# Patient Record
Sex: FEMALE | Race: WHITE | Employment: FULL TIME | URBAN - METROPOLITAN AREA
[De-identification: names, ages, dates, MRNs, and addresses within clinical notes are randomized per-mention and may not be internally consistent; named-entity substitution may affect disease eponyms.]

---

## 2023-09-01 PROBLEM — R09.81 SINUS CONGESTION: Status: ACTIVE | Noted: 2023-09-01

## 2023-09-01 PROBLEM — E56.9 VITAMIN DEFICIENCY: Status: ACTIVE | Noted: 2023-09-01

## 2023-09-01 PROBLEM — M79.18 MYOFASCIAL MUSCLE PAIN: Status: ACTIVE | Noted: 2023-09-01

## 2023-09-01 PROBLEM — J06.9 VIRAL URI: Status: ACTIVE | Noted: 2023-09-01

## 2023-09-01 PROBLEM — N94.10 DYSPAREUNIA IN FEMALE: Status: ACTIVE | Noted: 2023-09-01

## 2023-09-01 PROBLEM — M62.89 PELVIC FLOOR DYSFUNCTION: Status: ACTIVE | Noted: 2023-09-01

## 2023-09-01 PROBLEM — S49.90XA SHOULDER INJURY: Status: ACTIVE | Noted: 2023-09-01

## 2023-09-01 PROBLEM — N94.10 PAINFUL COITUS, FEMALE: Status: ACTIVE | Noted: 2023-09-01

## 2023-09-01 PROBLEM — N92.0 MENORRHAGIA WITH REGULAR CYCLE: Status: ACTIVE | Noted: 2023-09-01

## 2023-09-01 PROBLEM — R10.84 ABDOMINAL PAIN, DIFFUSE: Status: ACTIVE | Noted: 2023-09-01

## 2023-09-01 PROBLEM — R79.89 LOW VITAMIN D LEVEL: Status: ACTIVE | Noted: 2023-09-01

## 2023-09-01 PROBLEM — R14.0 BLOATING: Status: ACTIVE | Noted: 2023-09-01

## 2023-09-01 PROBLEM — N94.6 DYSMENORRHEA: Status: ACTIVE | Noted: 2023-09-01

## 2023-09-01 PROBLEM — N92.6 ABNORMAL MENSES: Status: ACTIVE | Noted: 2023-09-01

## 2023-09-01 PROBLEM — F41.9 ANXIETY: Status: ACTIVE | Noted: 2023-09-01

## 2023-09-01 PROBLEM — K81.1 CHRONIC CHOLECYSTITIS: Status: ACTIVE | Noted: 2023-09-01

## 2023-09-01 PROBLEM — N97.9 FEMALE INFERTILITY: Status: ACTIVE | Noted: 2023-09-01

## 2023-09-01 PROBLEM — R94.8 ABNORMAL BILIARY HIDA SCAN: Status: ACTIVE | Noted: 2023-09-01

## 2023-09-01 RX ORDER — ALBUTEROL SULFATE 90 UG/1
2 AEROSOL, METERED RESPIRATORY (INHALATION) EVERY 4 HOURS PRN
COMMUNITY
End: 2024-03-27 | Stop reason: WASHOUT

## 2023-09-01 RX ORDER — ACETAMINOPHEN 325 MG/1
650 TABLET ORAL EVERY 6 HOURS
COMMUNITY
Start: 2019-06-06 | End: 2024-05-01 | Stop reason: ALTCHOICE

## 2023-09-01 RX ORDER — ERGOCALCIFEROL 1.25 MG/1
1 CAPSULE ORAL
COMMUNITY
Start: 2020-01-20 | End: 2024-03-27 | Stop reason: WASHOUT

## 2023-09-01 RX ORDER — PANTOPRAZOLE SODIUM 20 MG/1
TABLET, DELAYED RELEASE ORAL
COMMUNITY
Start: 2019-03-26 | End: 2024-02-01 | Stop reason: WASHOUT

## 2023-09-01 RX ORDER — ZOLMITRIPTAN 5 MG/1
5 TABLET, FILM COATED ORAL DAILY PRN
COMMUNITY
Start: 2018-02-07 | End: 2024-03-27 | Stop reason: WASHOUT

## 2023-09-01 RX ORDER — DOCUSATE SODIUM 100 MG/1
100 CAPSULE, LIQUID FILLED ORAL 2 TIMES DAILY
COMMUNITY
Start: 2019-06-06 | End: 2024-03-27 | Stop reason: WASHOUT

## 2023-09-01 RX ORDER — CHOLECALCIFEROL (VITAMIN D3) 25 MCG
1 TABLET ORAL DAILY
COMMUNITY

## 2023-09-01 RX ORDER — ASCORBIC ACID 250 MG
TABLET ORAL
COMMUNITY

## 2023-09-01 RX ORDER — PANTOPRAZOLE SODIUM 40 MG/1
1 TABLET, DELAYED RELEASE ORAL DAILY
COMMUNITY
End: 2024-02-01

## 2023-09-01 RX ORDER — SULFAMETHOXAZOLE AND TRIMETHOPRIM 800; 160 MG/1; MG/1
1 TABLET ORAL
COMMUNITY
Start: 2021-11-09 | End: 2024-03-27 | Stop reason: WASHOUT

## 2023-09-01 RX ORDER — PREDNISONE 50 MG/1
50 TABLET ORAL DAILY
COMMUNITY
End: 2024-03-27 | Stop reason: WASHOUT

## 2023-09-01 RX ORDER — IBUPROFEN 600 MG/1
600 TABLET ORAL EVERY 6 HOURS
COMMUNITY
Start: 2019-06-06 | End: 2024-05-01 | Stop reason: WASHOUT

## 2023-10-01 DIAGNOSIS — N94.10 PAINFUL COITUS, FEMALE: Primary | ICD-10-CM

## 2023-10-02 ENCOUNTER — TREATMENT (OUTPATIENT)
Dept: PHYSICAL THERAPY | Facility: CLINIC | Age: 43
End: 2023-10-02
Payer: COMMERCIAL

## 2023-10-02 DIAGNOSIS — N94.10 PAINFUL COITUS, FEMALE: ICD-10-CM

## 2023-10-02 DIAGNOSIS — M62.89 PELVIC FLOOR DYSFUNCTION: Primary | ICD-10-CM

## 2023-10-02 PROCEDURE — 97530 THERAPEUTIC ACTIVITIES: CPT | Mod: GP

## 2023-10-02 PROCEDURE — 97110 THERAPEUTIC EXERCISES: CPT | Mod: GP

## 2023-10-02 PROCEDURE — 97140 MANUAL THERAPY 1/> REGIONS: CPT | Mod: GP

## 2023-10-02 ASSESSMENT — PAIN SCALES - GENERAL: PAINLEVEL_OUTOF10: 0 - NO PAIN

## 2023-10-02 NOTE — PROGRESS NOTES
Physical Therapy    Physical Therapy Treatment    Patient Name: Mary Ann Haro  MRN: [id number]  Today's Date: 10/2/2023  Time Calculation  Start Time: 0800  Stop Time: 0840  Time Calculation (min): 40 min      Assessment:  PT Assessment  PT Assessment Results:  (Decreased pain with internal examination/treatment, decreased leakage, improved spread of voids)  Rehab Prognosis: Excellent Decreased pain with internal examination/treatment, decreased leakage, improved spread of voids     Plan:  OP PT Plan  PT Plan:  (Continue per POC)Continue treatment per POC    Current Problem  1. Pelvic floor dysfunction        2. Painful coitus, female  Follow Up In Physical Therapy          Subjective   General  Pt states she tried a tampon, was fabian to have it in for 5-6 hours. Began to notice at the end it was time to take it out. Clamshells are still challenging.               Pain  Pain Score: 0 - No pain      Treatments:  Therapeutic Activity  Therapeutic Activity Performed:  (Educated pt on use of dilators. Went over types of dilators and handout given on use of dilators independently at home.)  Therapeutic Exercise:    -Recumbent bike 8mins    Therapeutic Activity:   Educated pt on use of dilators. Went over types of dilators and handout given on use of dilators independently at home.     Manual Therapy:   DTM/stretching of pelvic floor musculature    OP EDUCATION:  Education  Individual(s) Educated: Patient  Education Provided: Home Exercise Program, Other Education provided to pt on dilator use, POC, HEP.    Goals:  Encounter Problems       Encounter Problems (Active)       Pelvic Floor       Pt will reduce pelvic pain/discomfort to at most 3/10 with internal examination       Start:  10/02/23    Expected End:  11/27/23            Pt will be able to tolerate sexual intercourse with minimal limitations to improve sex life, per pt's goal       Start:  10/02/23    Expected End:  11/27/23            Pt will reduce urinary  "leakage by at least 50% to improve pelvic floor coordination        Start:  10/02/23    Expected End:  11/27/23            Pt will be able to spread voids 2-4 hours apart to improve bladder capacity       Start:  10/02/23    Expected End:  11/27/23            Pt will improve \"PFIQ-7\" score to 70 for increased independence and ease with ADL/IADL's, skills, and work/leisure activities.        Start:  10/02/23    Expected End:  11/27/23                    "

## 2023-10-09 ENCOUNTER — TREATMENT (OUTPATIENT)
Dept: PHYSICAL THERAPY | Facility: CLINIC | Age: 43
End: 2023-10-09
Payer: COMMERCIAL

## 2023-10-09 DIAGNOSIS — N94.10 PAINFUL COITUS, FEMALE: ICD-10-CM

## 2023-10-09 DIAGNOSIS — M62.89 PELVIC FLOOR DYSFUNCTION: Primary | ICD-10-CM

## 2023-10-09 PROCEDURE — 97530 THERAPEUTIC ACTIVITIES: CPT | Mod: GP

## 2023-10-09 ASSESSMENT — PAIN SCALES - GENERAL: PAINLEVEL_OUTOF10: 1

## 2023-10-09 ASSESSMENT — PAIN - FUNCTIONAL ASSESSMENT: PAIN_FUNCTIONAL_ASSESSMENT: 0-10

## 2023-10-09 NOTE — PROGRESS NOTES
"Physical Therapy    Physical Therapy Treatment / Re-evaluation    Patient Name: Mary Ann Haro  MRN: [id number]  Today's Date: 10/9/2023  Time Calculation  Start Time: 0800  Stop Time: 0824  Time Calculation (min): 24 min      Assessment: Pt reports significant improvement with leakage, bladder capacity, and pelvic pain with examination/intercourse.        Plan: Pt to complete independent use of dilator at home, If she has questions/concerns will return in 2 weeks for follow-up. If patient has no concerns, she will be discharged from PT services.       Current Problem  1. Pelvic floor dysfunction        2. Painful coitus, female  Follow Up In Physical Therapy          Subjective   General Pt states she is doing well, intimate twice. One was successful and one was not as successful, wasn't as consistent with exercises due to trip, did a lot of hiking. 6th visit. Pt has not had any urinary leakage in 1 week. Able to spread voids 2-4 hours apart.          Pain  Pain Assessment: 0-10  Pain Score: 1  Pain Type:  (Sore)  Pain Location:  (Low back)    Objective   Cognition     Posture   Posture: increased thoracic kyphosis, increased lumbar lordosis  Extremity/Trunk Assessment      Lumbar ROM:  -Extension: min limitation, \"very tight\"  -SB: min limitation Bilaterally  -Rotation: min limitation Bilaterally    Flexibility:  -Adductors: fair Bilaterally  -Glut max: fair Bilaterally    Strength:  -Hip abductors: 3+/5 Bilaterally  -Hip adductors: 3+/5 Bilaterally  -Hip extension: 4/5 Bilaterally          Internal Examination: Vaginal Strength: 3/5   Internal Palpation:     Pt denies pain with internal examinations, she states there can be slight discomfort 7 and 5 o'clock    Outcome Measures:  PFIQ-7: 29    Treatments:    Therapeutic Activity:              Pt brought in purchased dilator set, discussed cleaning and use with dilator. Pt to complete at home independently.          OP EDUCATION:   POC, dilator use    Goals:   " "  Encounter Problems         Encounter Problems (Active)         Pelvic Floor         Pt will reduce pelvic pain/discomfort to at most 3/10 with internal examination - GOAL MET 10/9/23        Start:  10/02/23    Expected End:  11/27/23              Pt will be able to tolerate sexual intercourse with minimal limitations to improve sex life, per pt's goal -PROGRESSING         Start:  10/02/23    Expected End:  11/27/23              Pt will reduce urinary leakage by at least 50% to improve pelvic floor coordination  - GOAL MET 10/9/23        Start:  10/02/23    Expected End:  11/27/23              Pt will be able to spread voids 2-4 hours apart to improve bladder capacity - GOAL MET 10/9/23        Start:  10/02/23    Expected End:  11/27/23              Pt will improve \"PFIQ-7\" score to 70 for increased independence and ease with ADL/IADL's, skills, and work/leisure activities.  -GOAL MET 10/9/23        Start:  10/02/23    Expected End:  11/27/23                  "

## 2023-10-17 ENCOUNTER — OFFICE VISIT (OUTPATIENT)
Dept: OBSTETRICS AND GYNECOLOGY | Facility: CLINIC | Age: 43
End: 2023-10-17
Payer: COMMERCIAL

## 2023-10-17 ENCOUNTER — APPOINTMENT (OUTPATIENT)
Dept: PHYSICAL THERAPY | Facility: CLINIC | Age: 43
End: 2023-10-17
Payer: COMMERCIAL

## 2023-10-17 VITALS
DIASTOLIC BLOOD PRESSURE: 81 MMHG | BODY MASS INDEX: 37.69 KG/M2 | HEART RATE: 65 BPM | SYSTOLIC BLOOD PRESSURE: 130 MMHG | HEIGHT: 62 IN | WEIGHT: 204.8 LBS

## 2023-10-17 DIAGNOSIS — R10.2 PELVIC PAIN: ICD-10-CM

## 2023-10-17 LAB
POC APPEARANCE, URINE: CLEAR
POC BILIRUBIN, URINE: NEGATIVE
POC BLOOD, URINE: NEGATIVE
POC COLOR, URINE: YELLOW
POC GLUCOSE, URINE: NEGATIVE MG/DL
POC KETONES, URINE: NEGATIVE MG/DL
POC LEUKOCYTES, URINE: NEGATIVE
POC NITRITE,URINE: NEGATIVE
POC PH, URINE: 6 PH
POC PROTEIN, URINE: NEGATIVE MG/DL
POC SPECIFIC GRAVITY, URINE: 1.02
POC UROBILINOGEN, URINE: 0.2 EU/DL

## 2023-10-17 PROCEDURE — 99213 OFFICE O/P EST LOW 20 MIN: CPT | Performed by: NURSE PRACTITIONER

## 2023-10-17 PROCEDURE — 81003 URINALYSIS AUTO W/O SCOPE: CPT | Mod: QW | Performed by: NURSE PRACTITIONER

## 2023-10-17 ASSESSMENT — PATIENT HEALTH QUESTIONNAIRE - PHQ9
SUM OF ALL RESPONSES TO PHQ9 QUESTIONS 1 AND 2: 1
2. FEELING DOWN, DEPRESSED OR HOPELESS: NOT AT ALL
1. LITTLE INTEREST OR PLEASURE IN DOING THINGS: SEVERAL DAYS

## 2023-10-17 ASSESSMENT — PAIN SCALES - GENERAL: PAINLEVEL: 0-NO PAIN

## 2023-10-17 NOTE — PROGRESS NOTES
History of Present illness    Pelvic Pain  The patient's primary symptoms include pelvic pain.   Pelvic pain  PFPT  Home release and exercise    Record Review:  Hx of pelvic pain.  Currently in pelvic floor physical therapy with Mary Carrillo.    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  head and face: head and face, unremarkable.   Pulmnary: no respiratory distress, unremarkable respiratory effort.   Psychiatric: alert and oriented x 3, affect normal to patient baseline, mood: appropriate judgement and insight: intact.     Assessment/Plan  43 y.o. assessed in clinic today for Pelvic pain    Pelvic pain    Plan  Pelvic pain  - Advised to keep usnig her exercises for future purposes   - Continue PFPT with Mary CALL in 6 months with HUMA Denney for pelvic pain fuv    I, Susana Pineda, am scribing for virtually, and in the presence of HUMA Denney on 10/17/2023 at 5:54 PM

## 2023-10-23 ENCOUNTER — APPOINTMENT (OUTPATIENT)
Dept: PHYSICAL THERAPY | Facility: CLINIC | Age: 43
End: 2023-10-23
Payer: COMMERCIAL

## 2023-10-31 ENCOUNTER — APPOINTMENT (OUTPATIENT)
Dept: PHYSICAL THERAPY | Facility: CLINIC | Age: 43
End: 2023-10-31
Payer: COMMERCIAL

## 2023-12-04 ENCOUNTER — DOCUMENTATION (OUTPATIENT)
Dept: PHYSICAL THERAPY | Facility: CLINIC | Age: 43
End: 2023-12-04
Payer: COMMERCIAL

## 2024-02-01 DIAGNOSIS — K21.9 CHRONIC GERD: Primary | ICD-10-CM

## 2024-02-01 RX ORDER — PANTOPRAZOLE SODIUM 40 MG/1
40 TABLET, DELAYED RELEASE ORAL DAILY
Qty: 30 TABLET | Refills: 0 | Status: SHIPPED | OUTPATIENT
Start: 2024-02-01 | End: 2024-02-26

## 2024-02-25 DIAGNOSIS — K21.9 CHRONIC GERD: ICD-10-CM

## 2024-02-26 RX ORDER — PANTOPRAZOLE SODIUM 40 MG/1
40 TABLET, DELAYED RELEASE ORAL DAILY
Qty: 90 TABLET | Refills: 1 | Status: SHIPPED | OUTPATIENT
Start: 2024-02-26 | End: 2024-03-27 | Stop reason: SDUPTHER

## 2024-03-27 ENCOUNTER — OFFICE VISIT (OUTPATIENT)
Dept: PRIMARY CARE | Facility: CLINIC | Age: 44
End: 2024-03-27
Payer: COMMERCIAL

## 2024-03-27 VITALS
OXYGEN SATURATION: 99 % | TEMPERATURE: 97.3 F | HEART RATE: 67 BPM | WEIGHT: 202.6 LBS | SYSTOLIC BLOOD PRESSURE: 128 MMHG | DIASTOLIC BLOOD PRESSURE: 79 MMHG | HEIGHT: 62 IN | BODY MASS INDEX: 37.28 KG/M2

## 2024-03-27 DIAGNOSIS — R09.81 SINUS CONGESTION: Primary | ICD-10-CM

## 2024-03-27 DIAGNOSIS — K21.9 CHRONIC GERD: ICD-10-CM

## 2024-03-27 PROCEDURE — 1036F TOBACCO NON-USER: CPT | Performed by: FAMILY MEDICINE

## 2024-03-27 PROCEDURE — 99214 OFFICE O/P EST MOD 30 MIN: CPT | Performed by: FAMILY MEDICINE

## 2024-03-27 RX ORDER — PANTOPRAZOLE SODIUM 40 MG/1
40 TABLET, DELAYED RELEASE ORAL DAILY
Qty: 90 TABLET | Refills: 1 | Status: SHIPPED | OUTPATIENT
Start: 2024-03-27

## 2024-03-27 RX ORDER — METHYLPREDNISOLONE 4 MG/1
TABLET ORAL
Qty: 21 TABLET | Refills: 0 | Status: SHIPPED | OUTPATIENT
Start: 2024-03-27 | End: 2024-05-01 | Stop reason: ALTCHOICE

## 2024-03-27 RX ORDER — AZELASTINE 1 MG/ML
1 SPRAY, METERED NASAL 2 TIMES DAILY
Qty: 30 ML | Refills: 12 | Status: SHIPPED | OUTPATIENT
Start: 2024-03-27 | End: 2025-03-27

## 2024-03-27 RX ORDER — FLUTICASONE PROPIONATE 50 MCG
SPRAY, SUSPENSION (ML) NASAL
COMMUNITY
Start: 2024-02-17 | End: 2024-05-01 | Stop reason: WASHOUT

## 2024-03-27 ASSESSMENT — ENCOUNTER SYMPTOMS
SINUS PRESSURE: 1
SINUS COMPLAINT: 1
SINUS PAIN: 1

## 2024-03-27 ASSESSMENT — PATIENT HEALTH QUESTIONNAIRE - PHQ9
1. LITTLE INTEREST OR PLEASURE IN DOING THINGS: NOT AT ALL
SUM OF ALL RESPONSES TO PHQ9 QUESTIONS 1 AND 2: 0
2. FEELING DOWN, DEPRESSED OR HOPELESS: NOT AT ALL

## 2024-03-27 ASSESSMENT — PAIN SCALES - GENERAL: PAINLEVEL: 1

## 2024-03-27 NOTE — PROGRESS NOTES
"Subjective   Patient ID: Mary Ann Haro is a 43 y.o. female who presents for Sinus Problem (Pt had sinus infection 1 month ago did telemed appt still having drainage and pressure under eyes and coughs when it drains  drainage is yellow or green).    Sinus Problem  Associated symptoms include sinus pressure.     Mary Ann is presenting for concerns of ongoing sinus pain and pressure, previously completed an antibiotic course without relief, has tried zyrtec, netipots, flonase vicks.   Continued to feel pressure particularly behind the eyes and below the eyes.  Nettipots aren't passing through   Currently taking claritin and flonase      Review of Systems   HENT:  Positive for sinus pressure and sinus pain.        Objective   /79   Pulse 67   Temp 36.3 °C (97.3 °F)   Ht 1.575 m (5' 2\")   Wt 91.9 kg (202 lb 9.6 oz)   SpO2 99%   BMI 37.06 kg/m²     Physical Exam  Constitutional:       Appearance: Normal appearance.      Comments: Frontal and maxillary tenderness to palpation   HENT:      Head: Normocephalic and atraumatic.      Right Ear: External ear normal.      Left Ear: External ear normal.      Nose: Nose normal. No congestion or rhinorrhea.   Eyes:      General: No scleral icterus.     Extraocular Movements: Extraocular movements intact.      Conjunctiva/sclera: Conjunctivae normal.   Cardiovascular:      Rate and Rhythm: Normal rate and regular rhythm.      Pulses: Normal pulses.      Heart sounds: Normal heart sounds.   Pulmonary:      Effort: Pulmonary effort is normal.      Breath sounds: Normal breath sounds.   Musculoskeletal:      Cervical back: Normal range of motion and neck supple. Tenderness present.   Skin:     General: Skin is warm and dry.      Capillary Refill: Capillary refill takes less than 2 seconds.   Neurological:      General: No focal deficit present.      Mental Status: She is alert and oriented to person, place, and time. Mental status is at baseline.   Psychiatric:        "  Mood and Affect: Mood normal.         Behavior: Behavior normal.         Thought Content: Thought content normal.         Judgment: Judgment normal.         Assessment/Plan   Diagnoses and all orders for this visit:  Sinus congestion  -     Referral to ENT; Future  -     azelastine (Astelin) 137 mcg (0.1 %) nasal spray; Administer 1 spray into each nostril 2 times a day. Use in each nostril as directed  -     methylPREDNISolone (Medrol, Luc,) 4 mg tablets; Follow schedule on package instructions  Chronic GERD  -     pantoprazole (ProtoNix) 40 mg EC tablet; Take 1 tablet (40 mg) by mouth once daily.    Mary Ann is a 43-year-old female presenting for ongoing pain and pressure behind the eyes.  Previously had a sinus infection completed course of antibiotics without much relief.  She particular reports pressure behind her eyes and below her eyes on physical exam she had pain to frontal sinuses and maxillary sinuses.  As patient reports [place] [facility] are not passing through concerns for inflammation or obstruction and she would benefit from a scope.  Patient's  will be presenting to Dr. Overton therefore referral placed today for the same physician.  Prescribed azelastine today in place of Flonase and refill on her Protonix. Medrol Dosepak given for concerns of inflammation.  Patient does not appear to be ill no fevers vitals remained stable no concerns for recurrence of infection.      I have personally reviewed all available pertinent labs, imaging, and consult notes with the patient.   All questions and concerns were addressed. Patient verbalizes understanding instructions and agrees with established plan of care.   Patient seen and discussed with Dr. Kaushik Aguayo MD

## 2024-04-23 ENCOUNTER — APPOINTMENT (OUTPATIENT)
Dept: OBSTETRICS AND GYNECOLOGY | Facility: CLINIC | Age: 44
End: 2024-04-23
Payer: COMMERCIAL

## 2024-04-24 ENCOUNTER — LAB (OUTPATIENT)
Dept: LAB | Facility: LAB | Age: 44
End: 2024-04-24
Payer: COMMERCIAL

## 2024-04-24 ENCOUNTER — OFFICE VISIT (OUTPATIENT)
Dept: OTOLARYNGOLOGY | Facility: CLINIC | Age: 44
End: 2024-04-24
Payer: COMMERCIAL

## 2024-04-24 VITALS — WEIGHT: 203 LBS | HEIGHT: 62 IN | BODY MASS INDEX: 37.36 KG/M2

## 2024-04-24 DIAGNOSIS — J31.0 CHRONIC RHINITIS: Primary | ICD-10-CM

## 2024-04-24 DIAGNOSIS — J31.0 CHRONIC RHINITIS: ICD-10-CM

## 2024-04-24 DIAGNOSIS — J34.2 NASAL SEPTAL DEVIATION: ICD-10-CM

## 2024-04-24 DIAGNOSIS — J34.3 HYPERTROPHY OF INFERIOR NASAL TURBINATE: ICD-10-CM

## 2024-04-24 DIAGNOSIS — J34.89 NASAL OBSTRUCTION: ICD-10-CM

## 2024-04-24 DIAGNOSIS — R09.81 SINUS CONGESTION: ICD-10-CM

## 2024-04-24 PROCEDURE — 1036F TOBACCO NON-USER: CPT | Performed by: NURSE PRACTITIONER

## 2024-04-24 PROCEDURE — 99204 OFFICE O/P NEW MOD 45 MIN: CPT | Performed by: NURSE PRACTITIONER

## 2024-04-24 PROCEDURE — 36415 COLL VENOUS BLD VENIPUNCTURE: CPT

## 2024-04-24 PROCEDURE — 31231 NASAL ENDOSCOPY DX: CPT | Performed by: NURSE PRACTITIONER

## 2024-04-24 PROCEDURE — 82785 ASSAY OF IGE: CPT

## 2024-04-24 PROCEDURE — 86003 ALLG SPEC IGE CRUDE XTRC EA: CPT

## 2024-04-24 RX ORDER — TRIAMCINOLONE ACETONIDE 55 UG/1
1 SPRAY, METERED NASAL 2 TIMES DAILY
Qty: 16.5 G | Refills: 11 | Status: SHIPPED | OUTPATIENT
Start: 2024-04-24 | End: 2025-04-24

## 2024-04-24 ASSESSMENT — PATIENT HEALTH QUESTIONNAIRE - PHQ9
1. LITTLE INTEREST OR PLEASURE IN DOING THINGS: SEVERAL DAYS
10. IF YOU CHECKED OFF ANY PROBLEMS, HOW DIFFICULT HAVE THESE PROBLEMS MADE IT FOR YOU TO DO YOUR WORK, TAKE CARE OF THINGS AT HOME, OR GET ALONG WITH OTHER PEOPLE: NOT DIFFICULT AT ALL
SUM OF ALL RESPONSES TO PHQ9 QUESTIONS 1 AND 2: 2
2. FEELING DOWN, DEPRESSED OR HOPELESS: SEVERAL DAYS

## 2024-04-24 NOTE — PROGRESS NOTES
Subjective   Patient ID: Mary Ann Haro is a 43 y.o. female who presents for Sinus Problem.    HPI  Mary Ann Haro is a 43 y.o. old female, referred by Dr. Brent Faulkner presenting with complaints of sinus and nose problems that began in December 2023 however, she reports having long standing chronic sinus issues her whole life. States she has had previous CT scans for this many years ago by PCP. The patient describes the sinus/nose problems as gradual and persistent sinusitis. Patient has facial pressure over her cheeks, PND, and constant throat clearing (milky / green / yellow ) that have persisted after a sinus infection she was treated for in February 2024. Denies rhinorrhea, facial pain, periorbital edema, nasal obstruction, hoarseness, loss of taste, and loss of smell. The patient denies epistaxis. The patient has taken Flonase nasal spray for three weeks, she did not notice benefit with this. She was told by her PCP to stop Flonase and start Azelastine. She has been on this for about one month and has noticed some benefit. Also takes Claritin daily. The patient has tried nasal saline irrigations with neti pot however she has difficultly with this passing through her nasal passages currently. She has also tried Vicks soothers, and steam showers which give some relief. Does have a history of allergic rhinitis or allergies. Denies previous allergy testing. Works as [employer]. They deny a history of aspirin sensitivity. They report 1-2 sinus infections per year requiring antibiotic treatment. Most recent antibiotic usage includes: February 2024 through [facility] for sinus infection, Doxycycline BID X 10 days.    History of prior nasal/sinus surgery or procedure: Denies.   Reports previous CT scan >10 years ago that did show chronic sinus disease.     I personally reviewed the patients CT head scan images from 12/10/2010.     I have also reviewed prior note from Dr. Brent Faulkner dated 3/27/24 and this is  contributing to my history and assessment.     Review of Systems  Review of systems is negative for constitutional, ophthalmological, cardiac, pulmonary, renal, gastrointestinal, musculoskeletal, mental health, endocrine, or neurologic disorders (except as listed in the HPI, PMH, and Problem List).     Objective   Physical Exam  CONSTITUTIONAL: Vital signs reviewed. Patient appears well developed and well nourished.   GENERAL: this is a healthy appearing female who appears stated age. The patient is alert and appropriately verbally conversant without hoarseness. This patient is in no apparent distress.   FACE: The face was inspected and no cutaneous masses or lesions were visualized. There was no erythema or edema noted. Facial movement was symmetric. No skin lesions were detected. There was no sinus tenderness elicited. TMJ crepitus absent.   EYES: Extra-ocular muscle function was intact. No nystagmus was observed. Pupils were equal.   CRANIAL NERVES: Cranial nerves II, III, IV, and VI were noted to be intact via extra-ocular muscle movement testing. Cranial nerve VII noted to be intact and symmetric by facial movement. Cranial nerves IX and X noted to be intact by gag reflex and palatal movement. Cranial nerve XII noted to be intact by active and symmetric tongue movement.   NOSE: Examination of the nose revealed the nasal dorsum to be midline. Intranasal exam reveals the septum is deviated left. The inferior turbinates were hypertrophic. No masses, polyps, mucopus, or other lesion on anterior rhinoscopy. See below procedure note as applicable for further exam.  ORAL CAVITY: Examination of the oral cavity revealed no mass lesions nor infection. The palate was noted to be intact. The tongue exhibited normal mobility. Mucosa was moist without lesion. The lips were free of lesion. Gums were free of inflammation. Dentition: normal without obvious infection or inflammation  OROPHARYNX: The oral pharynx was free of mass  lesion or mucosal abnormality. The palate was noted to be without lesion. The uvula was normal appearing. The tonsils were Normal.  EARS: Examination of the ears revealed that the auricles were normally formed with no lesions. The external auditory canals were normal. The tympanic membranes were intact.  There is no inflammation visualized.   NECK: Visualization and palpation of the neck revealed no mass lesions. No skin lesions or inflammatory processes were detected. The cervical musculature was normal to palpation.   CERVICAL LYMPHATICS: There were no palpable lymph nodes in the posterior triangle, submandibular triangle, jugulodigastric region, or central neck.  RESPIRATORY: Normal inspiration and expiration and chest wall expansion, no use of accessory muscles to breathe, no stridor.  NEUROLOGICAL: Patient is ambulatory without assist. Mentation is clear. Answering questions appropriately.     Nasal / sinus endoscopy    Date/Time: 4/24/2024 9:58 AM    Performed by: HUMA Ryan  Authorized by: HUMA Ryan    Consent:     Consent obtained:  Verbal    Consent given by:  Patient    Risks discussed:  Bleeding, infection and pain    Alternatives discussed:  No treatment, observation and delayed treatment  Procedure details:     Indications: assessment of airway and sino-nasal symptoms      Medication:  Afrin and Sotero-Synephrine 2%    Instrument: flexible fiberoptic nasal endoscope      Scope location: bilateral nare    Post-procedure details:     Patient tolerance of procedure:  Tolerated well, no immediate complications  Comments:      Findings: After topical decongestion with decongestant and anesthetic spray, nasal endoscopy was performed using an endoscope. The septum was deviated left. The inferior turbinates were hypertrophic. The middle turbinates appeared edematous on the left, the middle meatus is free of purulence, masses, lesions or polyps. The superior meatus and  sphenoethmoid recess are clear bilaterally. The nasal passageway is patent. The nasopharynx was clear. There were no complications and the patient tolerated the procedure well.        Assessment/Plan     Mary Ann Haro is a 43 y.o. year old female with symptoms and clinical findings consistent with chronic rhinitis, inferior turbinate hypertrophy and a left nasal septal deviation.      Plan:  Nasal endoscopy: Findings: left dev, ITH, polypoid changes to the middle turb  I discussed the findings the patient and offered reassurance and counseling.  We agreed to proceed with therapeutic measures to address the issues noted above.   1. We will have the patient start a steroid nasal spray to help improve nasal symptoms. Prescription for Nasacort  was given. I recommended the patient trial this for at least 1 month. They were instructed to use the spray 1 puff in each nostril twice daily. She was also instructed on the appropriate use of the medication, by point it towards the lateral wall of the nose/corner of the eye on the same side.    2. Patient was instructed to continue azelastine nasal spray. She was advised to increase this to 2 sprays twice daily.  3. I also recommended an allergy panel to assess for allergies that are potentially triggering the rhinitis.  4. Patient will follow-up in 6-8 weeks to assess for benefit of therapies and for further management.  All questions were answered and patient agrees with established plan of care.

## 2024-04-24 NOTE — PATIENT INSTRUCTIONS
Today you were evaluated by Hilda Vela CNP.    Please follow-up in 6-8 weeks or sooner if needed. If you have any questions or concerns, please contact my office at [phone].     Begin Nasacort with the azelastine. You can increase azelastine to 2 sprays twice daily.    MEDICATED NASAL SPRAY INSTRUCTIONS  Please take the prescribed nasal spray as directed. BE SURE TO POINT THE SPRAY TOWARDS THE CORNER OF THE EYE ON THE SAME SIDE NOSTRIL. This will ensure you are treating the appropriate parts of your nose that are swollen or inflamed.  This medication will take upwards of one month before you notice full benefit. You MUST use it every day for it to be effective.     Please obtain blood work. I will call you with results.

## 2024-04-24 NOTE — LETTER
April 24, 2024     Brent Faulkner DO  [address] [place] [id number]    Patient: Mary Ann Haro   YOB: 1980   Date of Visit: 4/24/2024       Dear Dr. Brent Faulkner DO:    Thank you for referring Mary Ann Haro to me for evaluation. Below are my notes for this consultation.  If you have questions, please do not hesitate to call me. I look forward to following your patient along with you.       Sincerely,     Hilda Vela, APRN-CNP      CC: No Recipients  ______________________________________________________________________________________    Subjective   Patient ID: Mary Ann Haro is a 43 y.o. female who presents for Sinus Problem.    HPI  Mary Ann Haro is a 43 y.o. old female, referred by Dr. Brent Faulkner presenting with complaints of sinus and nose problems that began in December 2023 however, she reports having long standing chronic sinus issues her whole life. States she has had previous CT scans for this many years ago by PCP. The patient describes the sinus/nose problems as gradual and persistent sinusitis. Patient has facial pressure over her cheeks, PND, and constant throat clearing (milky / green / yellow ) that have persisted after a sinus infection she was treated for in February 2024. Denies rhinorrhea, facial pain, periorbital edema, nasal obstruction, hoarseness, loss of taste, and loss of smell. The patient denies epistaxis. The patient has taken Flonase nasal spray for three weeks, she did not notice benefit with this. She was told by her PCP to stop Flonase and start Azelastine. She has been on this for about one month and has noticed some benefit. Also takes Claritin daily. The patient has tried nasal saline irrigations with neti pot however she has difficultly with this passing through her nasal passages currently. She has also tried Vicks soothers, and steam showers which give some relief. Does have a history of allergic rhinitis or allergies.  Denies previous allergy testing. Works as [employer]. They deny a history of aspirin sensitivity. They report 1-2 sinus infections per year requiring antibiotic treatment. Most recent antibiotic usage includes: February 2024 through [facility] for sinus infection, Doxycycline BID X 10 days.    History of prior nasal/sinus surgery or procedure: Denies.   Reports previous CT scan >10 years ago that did show chronic sinus disease.     I personally reviewed the patients CT head scan images from 12/10/2010.     I have also reviewed prior note from Dr. Brent Faulkner dated 3/27/24 and this is contributing to my history and assessment.     Review of Systems  Review of systems is negative for constitutional, ophthalmological, cardiac, pulmonary, renal, gastrointestinal, musculoskeletal, mental health, endocrine, or neurologic disorders (except as listed in the HPI, PMH, and Problem List).     Objective   Physical Exam  CONSTITUTIONAL: Vital signs reviewed. Patient appears well developed and well nourished.   GENERAL: this is a healthy appearing female who appears stated age. The patient is alert and appropriately verbally conversant without hoarseness. This patient is in no apparent distress.   FACE: The face was inspected and no cutaneous masses or lesions were visualized. There was no erythema or edema noted. Facial movement was symmetric. No skin lesions were detected. There was no sinus tenderness elicited. TMJ crepitus absent.   EYES: Extra-ocular muscle function was intact. No nystagmus was observed. Pupils were equal.   CRANIAL NERVES: Cranial nerves II, III, IV, and VI were noted to be intact via extra-ocular muscle movement testing. Cranial nerve VII noted to be intact and symmetric by facial movement. Cranial nerves IX and X noted to be intact by gag reflex and palatal movement. Cranial nerve XII noted to be intact by active and symmetric tongue movement.   NOSE: Examination of the nose revealed the nasal dorsum to be  midline. Intranasal exam reveals the septum is deviated left. The inferior turbinates were hypertrophic. No masses, polyps, mucopus, or other lesion on anterior rhinoscopy. See below procedure note as applicable for further exam.  ORAL CAVITY: Examination of the oral cavity revealed no mass lesions nor infection. The palate was noted to be intact. The tongue exhibited normal mobility. Mucosa was moist without lesion. The lips were free of lesion. Gums were free of inflammation. Dentition: normal without obvious infection or inflammation  OROPHARYNX: The oral pharynx was free of mass lesion or mucosal abnormality. The palate was noted to be without lesion. The uvula was normal appearing. The tonsils were Normal.  EARS: Examination of the ears revealed that the auricles were normally formed with no lesions. The external auditory canals were normal. The tympanic membranes were intact.  There is no inflammation visualized.   NECK: Visualization and palpation of the neck revealed no mass lesions. No skin lesions or inflammatory processes were detected. The cervical musculature was normal to palpation.   CERVICAL LYMPHATICS: There were no palpable lymph nodes in the posterior triangle, submandibular triangle, jugulodigastric region, or central neck.  RESPIRATORY: Normal inspiration and expiration and chest wall expansion, no use of accessory muscles to breathe, no stridor.  NEUROLOGICAL: Patient is ambulatory without assist. Mentation is clear. Answering questions appropriately.     Nasal / sinus endoscopy    Date/Time: 4/24/2024 9:58 AM    Performed by: HUMA Ryan  Authorized by: HUMA Ryan    Consent:     Consent obtained:  Verbal    Consent given by:  Patient    Risks discussed:  Bleeding, infection and pain    Alternatives discussed:  No treatment, observation and delayed treatment  Procedure details:     Indications: assessment of airway and sino-nasal symptoms      Medication:   Afrin and Sotero-Synephrine 2%    Instrument: flexible fiberoptic nasal endoscope      Scope location: bilateral nare    Post-procedure details:     Patient tolerance of procedure:  Tolerated well, no immediate complications  Comments:      Findings: After topical decongestion with decongestant and anesthetic spray, nasal endoscopy was performed using an endoscope. The septum was deviated left. The inferior turbinates were hypertrophic. The middle turbinates appeared edematous on the left, the middle meatus is free of purulence, masses, lesions or polyps. The superior meatus and sphenoethmoid recess are clear bilaterally. The nasal passageway is patent. The nasopharynx was clear. There were no complications and the patient tolerated the procedure well.        Assessment/Plan     Mary Ann Haro is a 43 y.o. year old female with symptoms and clinical findings consistent with chronic rhinitis, inferior turbinate hypertrophy and a left nasal septal deviation.      Plan:  Nasal endoscopy: Findings: left dev, ITH, polypoid changes to the middle turb  I discussed the findings the patient and offered reassurance and counseling.  We agreed to proceed with therapeutic measures to address the issues noted above.   1. We will have the patient start a steroid nasal spray to help improve nasal symptoms. Prescription for Nasacort  was given. I recommended the patient trial this for at least 1 month. They were instructed to use the spray 1 puff in each nostril twice daily. She was also instructed on the appropriate use of the medication, by point it towards the lateral wall of the nose/corner of the eye on the same side.    2. Patient was instructed to continue azelastine nasal spray. She was advised to increase this to 2 sprays twice daily.  3. I also recommended an allergy panel to assess for allergies that are potentially triggering the rhinitis.  4. Patient will follow-up in 6-8 weeks to assess for benefit of therapies and  for further management.  All questions were answered and patient agrees with established plan of care.

## 2024-04-25 LAB

## 2024-05-01 ENCOUNTER — OFFICE VISIT (OUTPATIENT)
Dept: OBSTETRICS AND GYNECOLOGY | Facility: CLINIC | Age: 44
End: 2024-05-01
Payer: COMMERCIAL

## 2024-05-01 VITALS
HEIGHT: 62 IN | BODY MASS INDEX: 37.73 KG/M2 | SYSTOLIC BLOOD PRESSURE: 115 MMHG | DIASTOLIC BLOOD PRESSURE: 73 MMHG | HEART RATE: 64 BPM | WEIGHT: 205 LBS

## 2024-05-01 DIAGNOSIS — M62.89 PELVIC FLOOR DYSFUNCTION: Primary | ICD-10-CM

## 2024-05-01 PROCEDURE — 99212 OFFICE O/P EST SF 10 MIN: CPT | Performed by: NURSE PRACTITIONER

## 2024-05-01 ASSESSMENT — PAIN SCALES - GENERAL: PAINLEVEL: 0-NO PAIN

## 2024-05-01 NOTE — PROGRESS NOTES
Subjective   Patient ID: Mary Ann Haro is a 43 y.o. female who presents for 6 mo. FUV/Pelvic pain. Last seen on 10/17/23 for pelvic pain and she was attending PFPT with Mary Carrillo.             HPI  Pelvic Symptoms:  - Patient has been doing well. She has gotten busy and fallen asleep before doing her exercises because work has been stressful.       - She is able to be sexually active currently. Changing positions can make intercourse more comfortable.    - She uses a wand for pelvic pain.   - Patient reports stress from her job has improved.     OBGYN Hx:   - She has a general OBGYN.   - Patient has menses and wears pads.     Interval History:   - She generally feels better after doing PFPT, having a cholecystectomy, and also finding out she had a gluten allergy.      Review of Systems    Objective   Physical Exam  Genitourinary:      Genitourinary Comments: Puborectalis and levators are sore on exam.           Assessment/Plan   43 y.o. female being assessed for pelvic pain.      Diagnoses:   #1 Pelvic pain    Plan:   1. Pelvic pain   - Continue with home exercises from PFPT.   - She feels that she is now at a good place with her pelvic pain improvement.      Follow-up in 1 year or sooner PRN with HUMA Morales.     Scribe Attestation:   I, Zoya Rodrigues, am scribing for virtually, and in the presence of HUMA Morales on 5/1/24 at 5:06 PM.    I, HUMA Morales, personally performed the services described in this documentation which was scribed virtually and I confirm that it is both accurate and complete.     HUMA Morales 05/01/24 3:01 PM

## 2024-06-26 ENCOUNTER — APPOINTMENT (OUTPATIENT)
Dept: OTOLARYNGOLOGY | Facility: CLINIC | Age: 44
End: 2024-06-26
Payer: COMMERCIAL

## 2024-07-24 ENCOUNTER — APPOINTMENT (OUTPATIENT)
Dept: OTOLARYNGOLOGY | Facility: CLINIC | Age: 44
End: 2024-07-24
Payer: COMMERCIAL

## 2024-07-24 DIAGNOSIS — J34.2 NASAL SEPTAL DEVIATION: ICD-10-CM

## 2024-07-24 DIAGNOSIS — J34.3 HYPERTROPHY OF INFERIOR NASAL TURBINATE: Primary | ICD-10-CM

## 2024-07-24 DIAGNOSIS — J34.89 NASAL OBSTRUCTION: ICD-10-CM

## 2024-07-24 DIAGNOSIS — J33.1 POLYPOID SINUS DEGENERATION: ICD-10-CM

## 2024-07-24 DIAGNOSIS — J31.0 CHRONIC RHINITIS: ICD-10-CM

## 2024-07-24 PROCEDURE — 99213 OFFICE O/P EST LOW 20 MIN: CPT | Performed by: NURSE PRACTITIONER

## 2024-07-24 PROCEDURE — 1036F TOBACCO NON-USER: CPT | Performed by: NURSE PRACTITIONER

## 2024-07-24 RX ORDER — FLUTICASONE PROPIONATE 93 UG/1
SPRAY, METERED NASAL
Qty: 16 ML | Refills: 11 | Status: SHIPPED | OUTPATIENT
Start: 2024-07-24

## 2024-07-24 ASSESSMENT — PATIENT HEALTH QUESTIONNAIRE - PHQ9
2. FEELING DOWN, DEPRESSED OR HOPELESS: SEVERAL DAYS
SUM OF ALL RESPONSES TO PHQ9 QUESTIONS 1 AND 2: 1
1. LITTLE INTEREST OR PLEASURE IN DOING THINGS: NOT AT ALL

## 2024-07-24 NOTE — PROGRESS NOTES
Subjective   Patient ID: aMry Ann Haro is a 43 y.o. female who presents for No chief complaint on file..    HPI  7/24/24- Patient presents for follow-up. She notes that symptoms of obstruction have improved with use of the flonase. She has continued with drainage (clear, more posterior than anterior). Her facial pain and pressure waxes and wanes. She has some days that are better than others. She had COVID 4 weeks ago.    She does have a history of acid reflux and takes protonix. Takes consistently 30 minutes prior to breakfast.     Review of Systems  Review of systems is negative for constitutional, ophthalmological, cardiac, pulmonary, renal, gastrointestinal, musculoskeletal, mental health, endocrine, or neurologic disorders (except as listed in the HPI, PMH, and Problem List).     Objective   Physical Exam  CONSTITUTIONAL:  Patient appears well developed and well nourished.   GENERAL: this is a healthy appearing female who appears stated age. The patient is alert and appropriately verbally conversant without hoarseness. This patient is in no apparent distress.   FACE: The face was inspected and no cutaneous masses or lesions were visualized. There was no erythema or edema noted. Facial movement was symmetric. No skin lesions were detected.   EYES: Extra-ocular muscle function was intact. No nystagmus was observed. Pupils were equal.   NOSE: Examination of the nose revealed the nasal dorsum to be midline. Intranasal exam reveals the septum is deviated left. The inferior turbinates were hypertrophic. No masses, polyps, mucopus, or other lesion on anterior rhinoscopy. See below procedure note as applicable for further exam.  RESPIRATORY: Normal inspiration and expiration and chest wall expansion, no use of accessory muscles to breathe, no stridor.  NEUROLOGICAL: Patient is ambulatory without assist. Mentation is clear. Answering questions appropriately.     LIMITED ANTERIOR RHINOSCOPY: After topical  decongestion with decongestant and anesthetic spray, anterior nasal endoscopy was performed using a 0 degree rigid scope.  The septum was deviated to the left. The inferior turbinates were hypertrophic.  The visualized portion of the middle turbinates appeared edematous on the left, the middle meatus is free of purulence, masses, lesions or polyp. There were no complications and the patient tolerated the procedure well.      Assessment/Plan     Mary Ann Haro is a 43 y.o. year old female with symptoms and clinical findings consistent with chronic rhinitis, inferior turbinate hypertrophy and a left nasal septal deviation.  7/24/24- Alter fluticasone to Xhance. Continue azelastine.       Plan:  Anterior rhinoscopy: Findings: left dev, ITH, polypoid changes to the middle turb  I discussed the findings the patient and offered reassurance and counseling.  We agreed to proceed with therapeutic measures to address the issues noted above.   1. We will have the patient alter her steroid nasal spray to help improve nasal symptoms. Prescription for Xhance  was given. She was provided with a sample today.   2. Patient was instructed to continue azelastine nasal spray. She was advised to increase this to 2 sprays twice daily.  3. Patient will follow-up in 2-3 months to assess for benefit of therapies and for further management.   All questions were answered and patient agrees with established plan of care.

## 2024-07-24 NOTE — PATIENT INSTRUCTIONS
Today you were evaluated by Hilda Vela CNP.    Please follow-up in 2-3 months or sooner if needed. If you have any questions or concerns, please contact my office at [phone].     We have ordered Xhance, a steroid nasal spray, for you to begin. This medication is administered 1 spray to each nostril twice daily via a specialized delivery device that helps get the medication further into your nose.     Please watch the demonstration video at [email or web address] prior to starting the medication.    Your prescription will be filled by [facility].  They will call you and proceed with further instructions.     If you have not heard from them within 48 hours, please contact my office to assist you.    Discontinue flonase.    Continue azelastine.

## 2024-10-15 ENCOUNTER — HOSPITAL ENCOUNTER (OUTPATIENT)
Dept: RADIOLOGY | Facility: HOSPITAL | Age: 44
Discharge: HOME | End: 2024-10-15
Payer: COMMERCIAL

## 2024-10-15 VITALS — WEIGHT: 205 LBS | HEIGHT: 62 IN | BODY MASS INDEX: 37.73 KG/M2

## 2024-10-15 DIAGNOSIS — Z12.31 SCREENING MAMMOGRAM FOR BREAST CANCER: ICD-10-CM

## 2024-10-15 PROCEDURE — 77067 SCR MAMMO BI INCL CAD: CPT | Performed by: RADIOLOGY

## 2024-10-15 PROCEDURE — 77067 SCR MAMMO BI INCL CAD: CPT

## 2024-10-15 PROCEDURE — 77063 BREAST TOMOSYNTHESIS BI: CPT | Performed by: RADIOLOGY

## 2024-12-09 DIAGNOSIS — K21.9 CHRONIC GERD: ICD-10-CM

## 2024-12-09 NOTE — TELEPHONE ENCOUNTER
Patient needs refill on Protonix 40mg takes one a day #90  Pharmacy [facility] on [address]  Appt  is in [place]

## 2024-12-11 RX ORDER — PANTOPRAZOLE SODIUM 40 MG/1
40 TABLET, DELAYED RELEASE ORAL DAILY
Qty: 90 TABLET | Refills: 1 | Status: SHIPPED | OUTPATIENT
Start: 2024-12-11

## 2025-02-03 ENCOUNTER — OFFICE VISIT (OUTPATIENT)
Dept: URGENT CARE | Age: 45
End: 2025-02-03
Payer: COMMERCIAL

## 2025-02-03 ENCOUNTER — ANCILLARY PROCEDURE (OUTPATIENT)
Dept: URGENT CARE | Age: 45
End: 2025-02-03
Payer: COMMERCIAL

## 2025-02-03 VITALS
TEMPERATURE: 97.5 F | OXYGEN SATURATION: 98 % | DIASTOLIC BLOOD PRESSURE: 81 MMHG | HEART RATE: 64 BPM | BODY MASS INDEX: 37.49 KG/M2 | RESPIRATION RATE: 16 BRPM | SYSTOLIC BLOOD PRESSURE: 126 MMHG | WEIGHT: 205 LBS

## 2025-02-03 DIAGNOSIS — M25.562 ACUTE PAIN OF LEFT KNEE: Primary | ICD-10-CM

## 2025-02-03 DIAGNOSIS — M25.562 ACUTE PAIN OF LEFT KNEE: ICD-10-CM

## 2025-02-03 PROCEDURE — 73562 X-RAY EXAM OF KNEE 3: CPT | Mod: LEFT SIDE | Performed by: REGISTERED NURSE

## 2025-02-03 RX ORDER — METHYLPREDNISOLONE 4 MG/1
TABLET ORAL
Qty: 21 TABLET | Refills: 0 | Status: SHIPPED | OUTPATIENT
Start: 2025-02-03 | End: 2025-02-09

## 2025-02-03 RX ORDER — DICLOFENAC SODIUM 10 MG/G
4 GEL TOPICAL 2 TIMES DAILY PRN
Qty: 50 G | Refills: 0 | Status: SHIPPED | OUTPATIENT
Start: 2025-02-03 | End: 2025-02-10

## 2025-02-03 NOTE — PROGRESS NOTES
Subjective   Patient ID: Mary Ann Haro is a 44 y.o. female. They present today with a chief complaint of left knee pain (Pt states she fell 1/7/25 and hurt her left knee. Now pain is increasing with decreased range of motion. Pain is 5/10).    History of Present Illness  See mdm       History provided by:  Patient   used: No        Past Medical History  Allergies as of 02/03/2025 - Reviewed 02/03/2025   Allergen Reaction Noted    Penicillins Anaphylaxis, Hives, and Rash 09/01/2023    Venlafaxine Other 09/01/2023    Zoloft [sertraline] Diarrhea and GI Upset 09/01/2023    Fluoxetine Diarrhea 09/01/2023    Latex, natural rubber Hives and Other 09/01/2023       (Not in a hospital admission)       Past Medical History:   Diagnosis Date    Other conditions influencing health status 01/15/2020    History of gluten sensitivity    Other microscopic colitis     Acute colonic cryptitis    Personal history of other diseases of the digestive system 01/15/2020    History of gastroesophageal reflux (GERD)    Personal history of other mental and behavioral disorders     History of post traumatic stress disorder       Past Surgical History:   Procedure Laterality Date    OTHER SURGICAL HISTORY  06/19/2019    Cholecystectomy laparoscopic        reports that she has never smoked. She has never used smokeless tobacco. She reports that she does not currently use alcohol. She reports that she does not use drugs.    Review of Systems  Review of Systems   All other systems reviewed and are negative.                                 Objective    Vitals:    02/03/25 1605   BP: 126/81   BP Location: Left arm   Patient Position: Sitting   BP Cuff Size: Adult   Pulse: 64   Resp: 16   Temp: 36.4 °C (97.5 °F)   TempSrc: Skin   SpO2: 98%   Weight: 93 kg (205 lb)     No LMP recorded (lmp unknown). Patient is perimenopausal.    Physical Exam  Vitals and nursing note reviewed.   Constitutional:       General: She is not in  acute distress.     Appearance: Normal appearance. She is normal weight. She is not ill-appearing, toxic-appearing or diaphoretic.   HENT:      Head: Normocephalic and atraumatic.      Mouth/Throat:      Mouth: Mucous membranes are moist.      Pharynx: No oropharyngeal exudate or posterior oropharyngeal erythema.   Eyes:      General: Scleral icterus: ortho.      Extraocular Movements: Extraocular movements intact.      Conjunctiva/sclera: Conjunctivae normal.      Pupils: Pupils are equal, round, and reactive to light.   Cardiovascular:      Rate and Rhythm: Normal rate and regular rhythm.   Pulmonary:      Effort: Pulmonary effort is normal.   Abdominal:      General: Abdomen is flat.   Musculoskeletal:         General: Tenderness present. No swelling or deformity. Normal range of motion.      Cervical back: Normal range of motion and neck supple. No rigidity or tenderness.      Comments: Left knee reported generalized pain inferior to patella nontender to palpation.  No bony deformity, erythema, ecchymosis or edema.  Range of motion of the knee is normal.  Active extension is intact.  Pain reproduced at flexion beyond 90 degrees, active.  No tenderness of the femur, calf, ankle or foot.  Compartments are soft and neurovascularly intact left lower extremity.   Lymphadenopathy:      Cervical: No cervical adenopathy.   Skin:     General: Skin is warm and dry.      Capillary Refill: Capillary refill takes less than 2 seconds.      Coloration: Skin is not jaundiced or pale.      Findings: No rash.   Neurological:      General: No focal deficit present.      Mental Status: She is alert.      Gait: Gait normal.   Psychiatric:         Mood and Affect: Mood normal.         Behavior: Behavior normal.         Procedures    Point of Care Test & Imaging Results from this visit  No results found for this visit on 02/03/25.   XR knee left 3 views    Result Date: 2/3/2025  Interpreted By:  Marvin Mathews, STUDY: XR KNEE LEFT 3  VIEWS; 2/3/2025 4:23 pm   INDICATION: Signs/Symptoms:injury.   COMPARISON: 12/12/2019   ACCESSION NUMBER(S): [id number]   ORDERING CLINICIAN: CRYSTAL SEVERINO   TECHNIQUE: Left knee three views   FINDINGS: No fractures or destructive lesions are identified. There is no evidence for an effusion. Joint spaces are maintained. There is no evidence for chondrocalcinosis.       No acute pathologic findings are identified. There is no interval change when compared to the previous examination.   MACRO: none   Signed by: Marvin Mathews 2/3/2025 4:29 PM Dictation workstation:   [id number]     Diagnostic study results (if any) were reviewed by Candace Dotson PA-C.    Assessment/Plan   Allergies, medications, history, and pertinent labs/EKGs/Imaging reviewed by Candace Dotson PA-C.     Medical Decision Making  44-year-old female presents with complaint of left knee pain.  Patient reports that she fell slipping on ice beginning of January.  She states that initially she had some generalized soreness however not particularly to her knee.  She states that since the fall she has developed pain of her left knee particularly while walking up and down steps.  She is taking ibuprofen and Tylenol without much improvement.  States that at times the pain is severe and causes her to tear up.  She is able to weight-bear and has much less pain while walking normally not on steps.  Exam benign.  X-ray without acute osseous abnormality.  No features of complete tenderness or ligamentous disruption, occult fracture, DVT, septic arthritis or other emergency.  Will treat with Medrol Dosepak and provide orthopedic follow-up for persistent symptoms.    Orders and Diagnoses  Diagnoses and all orders for this visit:  Acute pain of left knee  -     XR knee left 3 views; Future  -     methylPREDNISolone (Medrol Dospak) 4 mg tablets; Follow schedule on package instructions  -     Referral to Orthopaedic Surgery; Future  -     diclofenac sodium  (Voltaren) 1 % gel; Apply 4.5 inches (4 g) topically 2 times a day as needed (as needed for knee pain) for up to 7 days.      Medical Admin Record      Patient disposition: Home    Electronically signed by Candace Dotson PA-C  6:32 PM

## 2025-02-20 ENCOUNTER — APPOINTMENT (OUTPATIENT)
Dept: PRIMARY CARE | Facility: CLINIC | Age: 45
End: 2025-02-20
Payer: COMMERCIAL

## 2025-02-20 VITALS
HEIGHT: 62 IN | BODY MASS INDEX: 39.38 KG/M2 | DIASTOLIC BLOOD PRESSURE: 78 MMHG | WEIGHT: 214 LBS | SYSTOLIC BLOOD PRESSURE: 126 MMHG | HEART RATE: 74 BPM | OXYGEN SATURATION: 99 %

## 2025-02-20 DIAGNOSIS — M25.562 ACUTE PAIN OF LEFT KNEE: ICD-10-CM

## 2025-02-20 DIAGNOSIS — M17.12 PRIMARY OSTEOARTHRITIS OF LEFT KNEE: Primary | ICD-10-CM

## 2025-02-20 RX ORDER — TRIAMCINOLONE ACETONIDE 40 MG/ML
80 INJECTION, SUSPENSION INTRA-ARTICULAR; INTRAMUSCULAR
Status: COMPLETED | OUTPATIENT
Start: 2025-02-20 | End: 2025-02-20

## 2025-02-20 RX ORDER — LIDOCAINE HYDROCHLORIDE 10 MG/ML
3 INJECTION, SOLUTION INFILTRATION; PERINEURAL
Status: COMPLETED | OUTPATIENT
Start: 2025-02-20 | End: 2025-02-20

## 2025-02-20 RX ADMIN — TRIAMCINOLONE ACETONIDE 80 MG: 40 INJECTION, SUSPENSION INTRA-ARTICULAR; INTRAMUSCULAR at 11:33

## 2025-02-20 RX ADMIN — LIDOCAINE HYDROCHLORIDE 3 ML: 10 INJECTION, SOLUTION INFILTRATION; PERINEURAL at 11:33

## 2025-02-20 NOTE — PROGRESS NOTES
Clinic Note: Family Medicine    Patient: Mary Ann Haro  Encounter Date: 2/20/25  Subjective:  Chief Complaint   Patient presents with    Knee Pain     - left knee after fall on 1/7/25  - went to urgent care, had imaging done  - has ortho appt next week  -sore when walking     History of Present Illness:  Mary Ann Haro is a 44 y.o. female who presents for acute left knee pain.    On 1/7/2025 she was walking and tripped over a curb and fell forward.  She does not recall what body parts she hit though felt her whole body to be sore.  After a couple weeks, she noted left knee continuing to be painful despite ice, heat, TENS unit, ibuprofen 600 mg twice daily, Tylenol 3 25 2 times a day.  She went to urgent care 2/3/2025, obtain x-rays which was negative for any acute fracture or dislocation.  She was prescribed Medrol Dosepak and recommended Voltaren gel and brace.  She felt these also provided minimal relief.  Today, she feels like someone is taking ice pick underneath her kneecap.  Worse with going up and down stairs.  She has an appointment with orthopedics next week however wanted to get further evaluation today of her left knee.    Review of Systems:  See HPI    Past Medical History:   Past Medical History:   Diagnosis Date    Other conditions influencing health status 01/15/2020    History of gluten sensitivity    Other microscopic colitis     Acute colonic cryptitis    Personal history of other diseases of the digestive system 01/15/2020    History of gastroesophageal reflux (GERD)    Personal history of other mental and behavioral disorders     History of post traumatic stress disorder       Past Surgical History:   Procedure Laterality Date    OTHER SURGICAL HISTORY  06/19/2019    Cholecystectomy laparoscopic       Family History   Problem Relation Name Age of Onset    Anxiety disorder Mother      Depression Mother      Hypertension Mother      Osteoporosis Mother      Hyperlipidemia Mother       Anxiety disorder Father      Depression Father      Other (cardiac disorder) Father      Heart attack Father          Myocardial infarction    Hypertension Father      Hyperlipidemia Father      Prostate cancer Maternal Grandfather      Stroke Other Grandmother         due to embolism of cerebral artery    Blood clot Other Grandmother     Other (cardiac disorder) Other Grandmother     Heart attack Other Grandmother     Breast cancer Other Grandmother     Heart attack Other Grandfather     Breast cancer Paternal Grandmother Carissa parisinzel        Social History     Socioeconomic History    Marital status:      Spouse name: Not on file    Number of children: Not on file    Years of education: Not on file    Highest education level: Not on file   Occupational History    Not on file   Tobacco Use    Smoking status: Never    Smokeless tobacco: Never   Vaping Use    Vaping status: Never Used   Substance and Sexual Activity    Alcohol use: Not Currently    Drug use: Never    Sexual activity: Not on file   Other Topics Concern    Not on file   Social History Narrative    Not on file     Social Drivers of Health     Financial Resource Strain: Not on file   Food Insecurity: Not on file   Transportation Needs: Not on file   Physical Activity: Not on file   Stress: Not on file   Social Connections: Not on file   Intimate Partner Violence: Not on file   Housing Stability: Not on file       Medications:  Patient's Medications   New Prescriptions    No medications on file   Previous Medications    ASCORBIC ACID (VITAMIN C) 250 MG TABLET    Take by mouth.    AZELASTINE (ASTELIN) 137 MCG (0.1 %) NASAL SPRAY    Administer 1 spray into each nostril 2 times a day. Use in each nostril as directed    CALCIUM CARB-MAGNESIUM CARB 250-300 MG TABLET    Take 1 tablet by mouth once daily.    CALCIUM ORAL    Take by mouth.    CHOLECALCIFEROL (VITAMIN D-3) 25 MCG (1000 UT) TABLET    Take 1 tablet (25 mcg) by mouth once daily.    FLUTICASONE  "PROPIONATE (XHANCE) 93 MCG/ACTUATION AEROSOL BREATH ACTIVATED    Spray 1 spray in each nostril twice daily.    MULTIVITAMIN CAPSULE    Take 1 capsule by mouth once daily.    PANTOPRAZOLE (PROTONIX) 40 MG EC TABLET    Take 1 tablet (40 mg) by mouth once daily.    TRIAMCINOLONE (NASACORT) 55 MCG NASAL INHALER    Administer 1 spray into each nostril 2 times a day.   Modified Medications    No medications on file   Discontinued Medications    No medications on file     Objective:    /78   Pulse 74   Ht 1.575 m (5' 2\")   Wt 97.1 kg (214 lb)   LMP  (LMP Unknown)   SpO2 99%   BMI 39.14 kg/m²     Physical Exam  Constitutional:       General: She is not in acute distress.     Appearance: Normal appearance. She is not ill-appearing, toxic-appearing or diaphoretic.   HENT:      Head: Normocephalic and atraumatic.   Cardiovascular:      Comments: Well perfused  Pulmonary:      Comments: Breathing comfortably  Skin:     Capillary Refill: Capillary refill takes less than 2 seconds.   Neurological:      Mental Status: She is alert.        Left Knee examined. No effusion, ecchymosis, warmth or erythema. AROM from 0 to 130 deg with 5/5 strength. SILT overlying knee. Retropatellar crepitus present. Tenderness along medial and lateral joint lines.  No popliteal mass palpated. Negative anterior and posterior drawer.  No laxity to varus or valgus stress at 0 or 30 deg.  No patellar apprehension. Positive Hai.    Imagin/3/2025 x-ray left knee revealed no acute fracture or dislocation.  Mild to moderate degenerative changes (subchondral sclerosing and joint space narrowing) of the medial compartment.  Moderate degenerative changes (joint space narrowing) of the patellofemoral compartment.    Patient ID: Mary Ann Haro is a 44 y.o. female.    Joint Injection Large/Arthrocentesis: L knee on 2025 11:33 AM  Indications: pain  Details: 22 G needle, anterolateral approach (guidance: landmark)  Medications: 3 mL " lidocaine 10 mg/mL (1 %); 80 mg triamcinolone acetonide 40 mg/mL  Outcome: tolerated well, no immediate complications    Procedure risk factors to include increased pain, bleeding, infection, neurovascular injury, soft tissue injury, progressive cartilage loss, transient elevation of blood glucose and blood pressure, and adverse reaction to medication were discussed with the patient. Patient understands there is a moderate risk of morbidity from undergoing the procedure.  Procedure, treatment alternatives, risks and benefits explained, specific risks discussed. Consent was given by the patient. Immediately prior to procedure a time out was called to verify the correct patient, procedure, equipment, support staff and site/side marked as required. Patient was prepped and draped in the usual sterile fashion.         Assessment and Plan:       1. Acute pain of left knee  2. Primary osteoarthritis of left knee (Primary)  She most likely had an exacerbation of her mild to moderate osteoarthritis with possible meniscus tear (positive janet).  We discussed findings and diagnosis and management plan including NSAIDs, Tylenol, physical therapy, injections.  With shared decision making,  - Performed landmark guided left knee corticosteroid injection (see procedure note above)  - NSAIDs and Tylenol as needed    Return to clinic as needed.  If steroid injection does not improve her pain, consider MRI for further evaluation of her left knee.      Patient understands and agrees to plan. All questions and concerns were addressed.    Rodolfo Souza MD  Primary Care Sports Medicine Fellow  [facility] [place]  [facility]

## 2025-02-23 ENCOUNTER — ANCILLARY PROCEDURE (OUTPATIENT)
Dept: URGENT CARE | Age: 45
End: 2025-02-23
Payer: COMMERCIAL

## 2025-02-23 ENCOUNTER — OFFICE VISIT (OUTPATIENT)
Dept: URGENT CARE | Age: 45
End: 2025-02-23
Payer: COMMERCIAL

## 2025-02-23 VITALS
BODY MASS INDEX: 38.64 KG/M2 | HEART RATE: 64 BPM | WEIGHT: 210 LBS | SYSTOLIC BLOOD PRESSURE: 137 MMHG | RESPIRATION RATE: 18 BRPM | OXYGEN SATURATION: 100 % | TEMPERATURE: 98.4 F | DIASTOLIC BLOOD PRESSURE: 71 MMHG | HEIGHT: 62 IN

## 2025-02-23 DIAGNOSIS — R68.89 FLU-LIKE SYMPTOMS: ICD-10-CM

## 2025-02-23 DIAGNOSIS — J02.9 SORE THROAT: ICD-10-CM

## 2025-02-23 DIAGNOSIS — B96.89 BACTERIAL URI: Primary | ICD-10-CM

## 2025-02-23 DIAGNOSIS — R05.1 ACUTE COUGH: ICD-10-CM

## 2025-02-23 DIAGNOSIS — J06.9 BACTERIAL URI: Primary | ICD-10-CM

## 2025-02-23 LAB
POC RAPID INFLUENZA A: NEGATIVE
POC RAPID INFLUENZA B: NEGATIVE
POC RAPID STREP: NEGATIVE
POC SARS-COV-2 AG BINAX: NORMAL

## 2025-02-23 PROCEDURE — 3008F BODY MASS INDEX DOCD: CPT | Performed by: SURGERY

## 2025-02-23 PROCEDURE — 87880 STREP A ASSAY W/OPTIC: CPT | Performed by: SURGERY

## 2025-02-23 PROCEDURE — 87804 INFLUENZA ASSAY W/OPTIC: CPT | Performed by: SURGERY

## 2025-02-23 PROCEDURE — 71046 X-RAY EXAM CHEST 2 VIEWS: CPT | Performed by: SURGERY

## 2025-02-23 PROCEDURE — 99213 OFFICE O/P EST LOW 20 MIN: CPT | Performed by: SURGERY

## 2025-02-23 PROCEDURE — 87811 SARS-COV-2 COVID19 W/OPTIC: CPT | Performed by: SURGERY

## 2025-02-23 PROCEDURE — 1036F TOBACCO NON-USER: CPT | Performed by: SURGERY

## 2025-02-23 RX ORDER — PROMETHAZINE HYDROCHLORIDE AND DEXTROMETHORPHAN HYDROBROMIDE 6.25; 15 MG/5ML; MG/5ML
SYRUP ORAL
Qty: 80 ML | Refills: 0 | Status: SHIPPED | OUTPATIENT
Start: 2025-02-23

## 2025-02-23 RX ORDER — BENZONATATE 200 MG/1
200 CAPSULE ORAL 3 TIMES DAILY PRN
Qty: 30 CAPSULE | Refills: 0 | Status: SHIPPED | OUTPATIENT
Start: 2025-02-23 | End: 2025-03-02

## 2025-02-23 RX ORDER — AZITHROMYCIN 250 MG/1
TABLET, FILM COATED ORAL
Qty: 6 TABLET | Refills: 0 | Status: SHIPPED | OUTPATIENT
Start: 2025-02-23

## 2025-02-23 NOTE — PROGRESS NOTES
Chief Complaint   Patient presents with    Cough    Generalized Body Aches    Sinus Problem    Earache    Sore Throat     Pt c/o cough, bilateral ears muffled, body aches, sinus pressure/drainage, sore throat, started on Thursday. Otc not helping.  Self tested Friday covid, was negative       Physical Exam:     GEN: No acute distress    ENT: Bilateral TMs and canals unremarkable, sinus congestion present. Pharynx and tonsils mildly hyperemic but without exudate.     Resp: Lungs clear to auscultation bilaterally       POC Labs:     Office Visit on 02/23/2025   Component Date Value Ref Range Status    POC ROSIBEL-COV-2 AG 02/23/2025 Presumptive negative test for SARS-CoV-2 (no antigen detected)  Presumptive negative test for SARS-CoV-2 (no antigen detected) Final    POC Rapid Strep 02/23/2025 Negative  Negative Final    POC Rapid Influenza A 02/23/2025 Negative  Negative Final    POC Rapid Influenza B 02/23/2025 Negative  Negative Final        Encounter Diagnoses   Name Primary?    Sore throat     Flu-like symptoms     Bacterial URI Yes        Medical Decision Making & Plan:     Azithromycin  Tessalon   Prednisone      02/23/25 at 8:59 AM - Rosey Andrade DO

## 2025-02-25 ENCOUNTER — APPOINTMENT (OUTPATIENT)
Dept: ORTHOPEDIC SURGERY | Facility: CLINIC | Age: 45
End: 2025-02-25
Payer: COMMERCIAL

## 2025-03-04 ENCOUNTER — APPOINTMENT (OUTPATIENT)
Dept: ORTHOPEDIC SURGERY | Facility: CLINIC | Age: 45
End: 2025-03-04
Payer: COMMERCIAL

## 2025-03-04 DIAGNOSIS — M25.562 ACUTE PAIN OF LEFT KNEE: Primary | ICD-10-CM

## 2025-03-04 PROCEDURE — 99203 OFFICE O/P NEW LOW 30 MIN: CPT

## 2025-03-04 PROCEDURE — 1036F TOBACCO NON-USER: CPT

## 2025-03-04 ASSESSMENT — PAIN SCALES - GENERAL: PAINLEVEL_OUTOF10: 2

## 2025-03-04 ASSESSMENT — PAIN - FUNCTIONAL ASSESSMENT: PAIN_FUNCTIONAL_ASSESSMENT: 0-10

## 2025-03-04 NOTE — PROGRESS NOTES
HPI  Mary Ann Haro is a 44 y.o. female  in office today for   Chief Complaint   Patient presents with    Left Knee - Pain     Pt had a fall in January-she did hit the knee but didn't feel any serious pain at the time-as weeks have gone on the pain has increased-going up stairs causes a lot of pain-she was seen in UC-Pt saw her PCP and was given a CSI then.    .  she states the CSI has helped some, pain only 2/10 at this time.  Pain lateral side of the knee and under the patella.  She has also tried biofreeze, TENS, oral and topical antiinflammatories.    Reviewed UC and PCP note prior to appointment.      Medication  Current Outpatient Medications on File Prior to Visit   Medication Sig Dispense Refill    ascorbic acid (Vitamin C) 250 mg tablet Take by mouth.      azelastine (Astelin) 137 mcg (0.1 %) nasal spray Administer 1 spray into each nostril 2 times a day. Use in each nostril as directed 30 mL 12    azithromycin (Zithromax) 250 mg tablet Take 2 tabs day one then one tab days 2 through 5 6 tablet 0    [] benzonatate (Tessalon) 200 mg capsule Take 1 capsule (200 mg) by mouth 3 times a day as needed for cough for up to 7 days. Do not crush or chew. 30 capsule 0    calcium carb-magnesium carb 250-300 mg tablet Take 1 tablet by mouth once daily.      CALCIUM ORAL Take by mouth.      cholecalciferol (Vitamin D-3) 25 MCG (1000 UT) tablet Take 1 tablet (25 mcg) by mouth once daily.      fluticasone propionate (Xhance) 93 mcg/actuation aerosol breath activated Spray 1 spray in each nostril twice daily. 16 mL 11    multivitamin capsule Take 1 capsule by mouth once daily.      pantoprazole (ProtoNix) 40 mg EC tablet Take 1 tablet (40 mg) by mouth once daily. 90 tablet 1    promethazine-DM (Phenergan-DM) 6.25-15 mg/5 mL syrup 5 mL at bedtime prn cough 80 mL 0    triamcinolone (Nasacort) 55 mcg nasal inhaler Administer 1 spray into each nostril 2 times a day. (Patient not taking: Reported on 2025) 16.5 g  11     No current facility-administered medications on file prior to visit.       Physical Exam  Constitutional: well developed, well nourished female in no acute distress  Psychiatric: normal mood, appropriate affect  Eyes: sclera anicteric  HENT: normocephalic/atraumatic  CV: regular rate and rhythm   Respiratory: non labored breathing  Integumentary: no rash  Neurological: moves all extremities    Left Knee Exam     Tenderness   The patient is experiencing tenderness in the lateral joint line.    Range of Motion   The patient has normal left knee ROM.    Tests   Shante:  Medial - negative Lateral - positive  Varus: negative Valgus: negative  Drawer:  Anterior - negative     Posterior - negative  Patellar apprehension: negative    Other   Erythema: absent  Scars: absent  Sensation: normal  Swelling: mild  Effusion: no effusion present    Comments:  No crepitus              Imaging/Lab:  X-rays were taken 2/3/25 which were reviewed by myself and read by radiology and show No fractures or destructive lesions are identified. There is no evidence for an effusion. Joint spaces are maintained. There is no evidence for chondrocalcinosis.      Assessment  Assessment: left knee pain with concern for meniscus injury    Plan  Plan:  History, physical exam, and imaging were reviewed with patient. Discussed physical exam can not rule out meniscus injury.  She has already tried a number of conservative measures including RICE, antiinflammatories, CSI.  Would recommend PT at this time.  PT orders entered.  If she continues with pain after trial of PT, can consider MRI imaging in the future.  Follow Up: Patient to follow up as needed if pain persists or gets worse.      All questions were answered for the patient prior to end of exam and patient addressed their understanding.    Brina Cooper PA-C  03/04/25

## 2025-03-25 ENCOUNTER — EVALUATION (OUTPATIENT)
Dept: PHYSICAL THERAPY | Facility: CLINIC | Age: 45
End: 2025-03-25
Payer: COMMERCIAL

## 2025-03-25 DIAGNOSIS — M25.562 CHRONIC PAIN OF LEFT KNEE: Primary | ICD-10-CM

## 2025-03-25 DIAGNOSIS — G89.29 CHRONIC PAIN OF LEFT KNEE: Primary | ICD-10-CM

## 2025-03-25 PROCEDURE — 97110 THERAPEUTIC EXERCISES: CPT | Mod: GP

## 2025-03-25 PROCEDURE — 97162 PT EVAL MOD COMPLEX 30 MIN: CPT | Mod: GP

## 2025-03-25 SDOH — ECONOMIC STABILITY: GENERAL: QUALITY OF LIFE: FAIR

## 2025-03-25 ASSESSMENT — ENCOUNTER SYMPTOMS
ALLEVIATING FACTORS: CHANGE IN POSITION
EXACERBATED BY: LIFTING
PAIN SCALE AT HIGHEST: 8
EXACERBATED BY: OVERHEAD ACTIVITY
PAIN SCALE: 5
QUALITY: PRESSURE
ALLEVIATING FACTORS: MEDICATIONS
QUALITY: DULL ACHE
ALLEVIATING FACTORS: HEAT
ALLEVIATING FACTORS: ICE
PAIN SCALE AT LOWEST: 2
ALLEVIATING FACTORS: RELAXATION
QUALITY: DISCOMFORT
ALLEVIATING FACTORS: SUPPORT
ALLEVIATING FACTORS: REST
EXACERBATED BY: MOVEMENT
QUALITY: TIGHT

## 2025-03-31 ENCOUNTER — TREATMENT (OUTPATIENT)
Dept: PHYSICAL THERAPY | Facility: CLINIC | Age: 45
End: 2025-03-31
Payer: COMMERCIAL

## 2025-03-31 DIAGNOSIS — G89.29 CHRONIC PAIN OF LEFT KNEE: Primary | ICD-10-CM

## 2025-03-31 DIAGNOSIS — M25.562 CHRONIC PAIN OF LEFT KNEE: Primary | ICD-10-CM

## 2025-03-31 PROCEDURE — 97140 MANUAL THERAPY 1/> REGIONS: CPT | Mod: GP,CQ

## 2025-03-31 PROCEDURE — 97110 THERAPEUTIC EXERCISES: CPT | Mod: GP,CQ

## 2025-03-31 ASSESSMENT — PAIN SCALES - GENERAL: PAINLEVEL_OUTOF10: 2

## 2025-03-31 ASSESSMENT — PAIN - FUNCTIONAL ASSESSMENT: PAIN_FUNCTIONAL_ASSESSMENT: 0-10

## 2025-03-31 NOTE — PROGRESS NOTES
"  Physical Therapy Treatment    Patient Name: Mary Ann Haro  MRN: [id number]  Today's Date: 3/31/2025  Time Calculation  Start Time: 1731  Stop Time: 1825  Time Calculation (min): 54 min  PT Therapeutic Procedures Time Entry  Manual Therapy Time Entry: 20  Therapeutic Exercise Time Entry: 34,      Current Problem  Problem List Items Addressed This Visit    None  Visit Diagnoses         Codes    Chronic pain of left knee    -  Primary M25.562, G89.29            Insurance:  Number of Treatments Authorized: 2/40            Subjective   General  Reason for Referral: left knee pain  Referred By: Kaushik Rdz Comment: Patient notes she just got off work.  She was on concrete all day. C/O L M/L joint line pain.  Knee feels unstable. Stair climbing is painful.  Would like to review standing gastroc and soleus stretches.    Performing HEP?: Yes    Precautions     Pain  Pain Assessment: 0-10  0-10 (Numeric) Pain Score: 2  Pain Location: Knee  Pain Orientation: Outer, Inner    Objective   L knee valgus collapse    Treatments:    Therapeutic Exercise  Therapeutic Exercise Activity 1: Scifit Man L2 x 6 min  Therapeutic Exercise Activity 2: review and perform R/L gastroc and soues stretching 2 x 30\" ea  Therapeutic Exercise Activity 3: 5#/5# dangle and swing x 3 min  Therapeutic Exercise Activity 4: seated with heel prop - QS 5\" x 10  Therapeutic Exercise Activity 5: seated heel slides x 2 min  Therapeutic Exercise Activity 6: dynamics march, butt kick, R/L side stepping x 40' ea  Therapeutic Exercise Activity 7: ball wall TKE 2 x 10  Therapeutic Exercise Activity 8: YTB loop at ankles - alt R/L hip ABD x 1 min  Therapeutic Exercise Activity 9: YTB loop at ankles alt hip ext x 1 min  Therapeutic Exercise Activity 10: TGym L5 DL squats 2 x 10  Therapeutic Exercise Activity 11: HL BTB R/L iso clam 2 x 10 ea         Manual Therapy  Manual Therapy Activity 1: MFR L leg pull\  Manual Therapy Activity 2: stretch R/L gluts, HS, " ADD, ITB, piriformis  Manual Therapy Activity 3: STM L ITB and lateral quad  Manual Therapy Activity 4: L PF mobs                     OP EDUCATION:  Outpatient Education  Education Comment: knee ROM while sitting at meetings    Assessment:  PT Assessment  Assessment Comment: Today's session focused on L LQ streengthening and stabilization.  Cueing to engage gluts during TGym squat to minimize knee valgus.  Challenged with dynamaics due to decreased stability in L LE WB.  Gait more fluid on the way out.  Painfree after session.    Plan:  OP PT Plan  Treatment/Interventions: Cryotherapy, Dry needling, Education/ Instruction, Manual therapy, Neuromuscular re-education, Prosthetic management/ training, Taping techniques, Therapeutic activities, Therapeutic exercises  PT Plan: Skilled PT  PT Frequency: 2 times per week  Duration: 5weeks  Number of Treatments Authorized: 2/40  Rehab Potential: Good  Plan of Care Agreement: Patient    Goals:       Solange Tolbert, PTA

## 2025-04-02 ENCOUNTER — TREATMENT (OUTPATIENT)
Dept: PHYSICAL THERAPY | Facility: CLINIC | Age: 45
End: 2025-04-02
Payer: COMMERCIAL

## 2025-04-02 DIAGNOSIS — M25.562 CHRONIC PAIN OF LEFT KNEE: Primary | ICD-10-CM

## 2025-04-02 DIAGNOSIS — G89.29 CHRONIC PAIN OF LEFT KNEE: Primary | ICD-10-CM

## 2025-04-02 PROCEDURE — 97110 THERAPEUTIC EXERCISES: CPT | Mod: GP,CQ

## 2025-04-02 PROCEDURE — 97140 MANUAL THERAPY 1/> REGIONS: CPT | Mod: GP,CQ

## 2025-04-02 ASSESSMENT — PAIN SCALES - GENERAL: PAINLEVEL_OUTOF10: 2

## 2025-04-02 ASSESSMENT — PAIN - FUNCTIONAL ASSESSMENT: PAIN_FUNCTIONAL_ASSESSMENT: 0-10

## 2025-04-02 NOTE — PROGRESS NOTES
Physical Therapy Treatment    Patient Name: Mary Ann Haro  MRN: [id number]  Today's Date: 4/2/2025  Time Calculation  Start Time: 0826  Stop Time: 0909  Time Calculation (min): 43 min  PT Therapeutic Procedures Time Entry  Manual Therapy Time Entry: 15  Therapeutic Exercise Time Entry: 28,      Current Problem  Problem List Items Addressed This Visit    None  Visit Diagnoses         Codes    Chronic pain of left knee    -  Primary M25.562, G89.29            Insurance:  Number of Treatments Authorized: 3/40            Subjective   General  Reason for Referral: left knee pain  Referred By: Kaushik  General Comment: Patient notes her knee is doing well, except  she hit her knee and it's sore.  Getting better result with gastroc and soleus stretching.  L hip mobility is less than the R.  Feeling more steady on the stairs.    Performing HEP?: Yes    Precautions     Pain  Pain Assessment: 0-10  0-10 (Numeric) Pain Score: 2  Pain Location: Knee  Pain Orientation: Outer, Inner    Objective       Treatments:    Therapeutic Exercise  Therapeutic Exercise Activity 1: Scifit Man L2 x 6 min  Therapeutic Exercise Activity 2: incline gastroc stretch x 1 min  Therapeutic Exercise Activity 3: incline soleus stretch x 1 min  Therapeutic Exercise Activity 4: dynamics march, butt kick, tin soldier, mini lunge x 40' ea  Therapeutic Exercise Activity 5: T Gym L6 squats 3 x 10  Therapeutic Exercise Activity 6: YTB loop at ankles - alt R/L hip ABD x 1 min  Therapeutic Exercise Activity 7: YTB loop at ankles alt hip ext x 1 min  Therapeutic Exercise Activity 8: 5#/5# dangle and swing x 3 min  Therapeutic Exercise Activity 9: NWB HF stretch x 2 min    Balance/Neuromuscular Re-Education  Balance/Neuromuscular Re-Education Activity 1: tilt board #2 controlled taps and balance x 1 min ea  Balance/Neuromuscular Re-Education Activity 2: tilt board #1 controlled taps and balance x 1 min ea    Manual Therapy  Manual Therapy Activity 1: MFR L  leg pull  Manual Therapy Activity 2: stretch R/L gluts, HS, ADD, ITB, piriformis, gastroc  Manual Therapy Activity 3: STM L ITB and lateral quad, gastroc  Manual Therapy Activity 4: L PF mobs                     OP EDUCATION:       Assessment:  PT Assessment  Assessment Comment: Patient arrived with mild increase in muscle soreness.  Bumped her knee below the patella with some soreness as well.  Able to complete today's session with improved balance and stability with dynamic drills.  Felt great afterwards.    Plan:  OP PT Plan  Treatment/Interventions: Cryotherapy, Dry needling, Education/ Instruction, Manual therapy, Neuromuscular re-education, Prosthetic management/ training, Taping techniques, Therapeutic activities, Therapeutic exercises  PT Plan: Skilled PT  PT Frequency: 2 times per week  Duration: 5weeks  Number of Treatments Authorized: 3/40  Rehab Potential: Good  Plan of Care Agreement: Patient    Goals:       Solange Tolbert, PTA

## 2025-04-07 ENCOUNTER — TREATMENT (OUTPATIENT)
Dept: PHYSICAL THERAPY | Facility: CLINIC | Age: 45
End: 2025-04-07
Payer: COMMERCIAL

## 2025-04-07 DIAGNOSIS — G89.29 CHRONIC PAIN OF LEFT KNEE: Primary | ICD-10-CM

## 2025-04-07 DIAGNOSIS — M25.562 CHRONIC PAIN OF LEFT KNEE: Primary | ICD-10-CM

## 2025-04-07 PROCEDURE — 97110 THERAPEUTIC EXERCISES: CPT | Mod: GP,CQ

## 2025-04-07 PROCEDURE — 97140 MANUAL THERAPY 1/> REGIONS: CPT | Mod: GP,CQ

## 2025-04-07 ASSESSMENT — PAIN SCALES - GENERAL: PAINLEVEL_OUTOF10: 1

## 2025-04-07 ASSESSMENT — PAIN - FUNCTIONAL ASSESSMENT: PAIN_FUNCTIONAL_ASSESSMENT: 0-10

## 2025-04-07 NOTE — PROGRESS NOTES
Physical Therapy Treatment    Patient Name: Mary Ann Haro  MRN: [id number]  Today's Date: 4/7/2025  Time Calculation  Start Time: 1558  Stop Time: 1642  Time Calculation (min): 44 min  PT Therapeutic Procedures Time Entry  Manual Therapy Time Entry: 14  Therapeutic Exercise Time Entry: 30,      Current Problem  Problem List Items Addressed This Visit    None  Visit Diagnoses         Codes    Chronic pain of left knee    -  Primary M25.562, G89.29            Insurance:  Number of Treatments Authorized: 4/40            Subjective   General  Reason for Referral: left knee pain  Referred By: Kaushik  General Comment: Been sitting most of the day.  Knee is a bit stiff, but not painful.  Calves are less tight    Performing HEP?: Yes    Precautions     Pain  Pain Assessment: 0-10  0-10 (Numeric) Pain Score: 1  Pain Location: Knee  Pain Orientation: Outer, Inner    Objective       Treatments:    Therapeutic Exercise  Therapeutic Exercise Activity 1: SciFit Man L3 x 5 min  Therapeutic Exercise Activity 2: incline g/s stretch x 1 min  Therapeutic Exercise Activity 3: incline heel raises x 20  Therapeutic Exercise Activity 4: dynamics march, butt kick, tin soldier, mini lunge x 40' ea  Therapeutic Exercise Activity 5: standing R/L HS stretch x 1 min ea  Therapeutic Exercise Activity 6: YTB loop footworm x 30' ea  Therapeutic Exercise Activity 7: YTB ZigZag x 30' ea  Therapeutic Exercise Activity 8: B HS curls 30# x 1 min  Therapeutic Exercise Activity 9: 5#/5# dangle and swing x 3 min  Therapeutic Exercise Activity 10: 5#/5# alt LAQ x 1 min  Therapeutic Exercise Activity 11: T Gym L7 squats 3 x 10  Therapeutic Exercise Activity 12: NWB HF stretch x 2 min ea R/L  Therapeutic Exercise Activity 13: bridge with ADD x 10         Manual Therapy  Manual Therapy Activity 1: STM L ITB and lateral quad, gastroc  Manual Therapy Activity 2: L PF mobs  Manual Therapy Activity 3: MFR L leg pull  Manual Therapy Activity 4: K tape L  patella ()                     OP EDUCATION:       Assessment:  PT Assessment  Assessment Comment: Patient is progressing towards her goals.  Mild soreness under the knee cap.  Applied Ktape to unload patella. It felt good.    Plan:  OP PT Plan  Treatment/Interventions: Cryotherapy, Dry needling, Education/ Instruction, Manual therapy, Neuromuscular re-education, Prosthetic management/ training, Taping techniques, Therapeutic activities, Therapeutic exercises  PT Plan: Skilled PT  PT Frequency: 2 times per week  Duration: 5weeks  Number of Treatments Authorized: 4/40  Rehab Potential: Good  Plan of Care Agreement: Patient    Goals:       Solange Tolbert, PTA

## 2025-04-09 ENCOUNTER — TREATMENT (OUTPATIENT)
Dept: PHYSICAL THERAPY | Facility: CLINIC | Age: 45
End: 2025-04-09
Payer: COMMERCIAL

## 2025-04-09 DIAGNOSIS — G89.29 CHRONIC PAIN OF LEFT KNEE: Primary | ICD-10-CM

## 2025-04-09 DIAGNOSIS — M25.562 CHRONIC PAIN OF LEFT KNEE: Primary | ICD-10-CM

## 2025-04-09 PROCEDURE — 97140 MANUAL THERAPY 1/> REGIONS: CPT | Mod: GP,CQ

## 2025-04-09 PROCEDURE — 97110 THERAPEUTIC EXERCISES: CPT | Mod: GP,CQ

## 2025-04-09 ASSESSMENT — PAIN SCALES - GENERAL: PAINLEVEL_OUTOF10: 1

## 2025-04-09 ASSESSMENT — PAIN - FUNCTIONAL ASSESSMENT: PAIN_FUNCTIONAL_ASSESSMENT: 0-10

## 2025-04-09 NOTE — PROGRESS NOTES
"  Physical Therapy Treatment    Patient Name: Mary Ann Haro  MRN: [id number]  Today's Date: 4/9/2025  Time Calculation  Start Time: 0743  Stop Time: 0830  Time Calculation (min): 47 min  PT Therapeutic Procedures Time Entry  Manual Therapy Time Entry: 15  Therapeutic Exercise Time Entry: 32,      Current Problem  Problem List Items Addressed This Visit    None  Visit Diagnoses         Codes    Chronic pain of left knee    -  Primary M25.562, G89.29            Insurance:  Number of Treatments Authorized: 5/40            Subjective   General  Reason for Referral: left knee pain  Referred By: Kaushik  General Comment: Knee is feeling great.  K tape was great - still intact.  Claves still seem to be pretty tight.  L side is tighter than right and frrling it into her heel.    Performing HEP?: Yes    Precautions     Pain  Pain Assessment: 0-10  0-10 (Numeric) Pain Score: 1  Pain Location: Knee  Pain Orientation: Outer, Inner    Objective       Treatments:    Therapeutic Exercise  Therapeutic Exercise Activity 1: SciFit Man L3 x 6 min  Therapeutic Exercise Activity 2: incline g/s stretch x 1 min  Therapeutic Exercise Activity 3: incline heel raises x 20  Therapeutic Exercise Activity 4: incline heel raises x 10  Therapeutic Exercise Activity 5: T Gym L7 DL squats 3 x 10 (0,1,2 bands)  Therapeutic Exercise Activity 6: T Gym L4 R/L SL squat 2 x 10 ea  Therapeutic Exercise Activity 7: stool scoot FWD 2 x 40'  Therapeutic Exercise Activity 8: standing R/L dynamic HS stretch (hios supported on the wall) x 2 min  Therapeutic Exercise Activity 9: bridge with ADD 5\" 1 x 10; 1 x 5  Therapeutic Exercise Activity 10: R/L SL clamshell with RTB x 10 ea  Therapeutic Exercise Activity 11: NWB HF stretch R/l x 2 min ea         Manual Therapy  Manual Therapy Activity 1: prone IASTM L>R gastroc, soleus, Achilles, PF  Manual Therapy Activity 2: manual stretch R/L gastroc, soleus, PF  Manual Therapy Activity 3: manual stretch B gluts, HS, " piriformis, ADD, HF, quad                     OP EDUCATION:  Outpatient Education  Education Comment: Consider updating your shoes    Assessment:  PT Assessment  Assessment Comment: Patient arrived with L>R calf tightness.  Addressed with IASTM and stretching - recommend shoe fitting. PAtient was able to perform today's session without knee pain.  Achieved good muscle fatigue.  Warned post exercise soreness.    Plan:  OP PT Plan  Treatment/Interventions: Cryotherapy, Dry needling, Education/ Instruction, Manual therapy, Neuromuscular re-education, Prosthetic management/ training, Taping techniques, Therapeutic activities, Therapeutic exercises  PT Plan: Skilled PT  PT Frequency: 2 times per week  Duration: 5weeks  Number of Treatments Authorized: 5/40  Rehab Potential: Good  Plan of Care Agreement: Patient    Goals:       Solange Tolbert, PTA

## 2025-04-14 ENCOUNTER — APPOINTMENT (OUTPATIENT)
Dept: PHYSICAL THERAPY | Facility: CLINIC | Age: 45
End: 2025-04-14
Payer: COMMERCIAL

## 2025-04-14 DIAGNOSIS — G89.29 CHRONIC PAIN OF LEFT KNEE: Primary | ICD-10-CM

## 2025-04-14 DIAGNOSIS — M25.562 CHRONIC PAIN OF LEFT KNEE: Primary | ICD-10-CM

## 2025-04-16 ENCOUNTER — TREATMENT (OUTPATIENT)
Dept: PHYSICAL THERAPY | Facility: CLINIC | Age: 45
End: 2025-04-16
Payer: COMMERCIAL

## 2025-04-16 DIAGNOSIS — G89.29 CHRONIC PAIN OF LEFT KNEE: Primary | ICD-10-CM

## 2025-04-16 DIAGNOSIS — M25.562 CHRONIC PAIN OF LEFT KNEE: Primary | ICD-10-CM

## 2025-04-16 PROCEDURE — 97110 THERAPEUTIC EXERCISES: CPT | Mod: GP,CQ

## 2025-04-16 PROCEDURE — 97140 MANUAL THERAPY 1/> REGIONS: CPT | Mod: GP,CQ

## 2025-04-16 ASSESSMENT — PAIN SCALES - GENERAL: PAINLEVEL_OUTOF10: 0 - NO PAIN

## 2025-04-16 ASSESSMENT — PAIN - FUNCTIONAL ASSESSMENT: PAIN_FUNCTIONAL_ASSESSMENT: 0-10

## 2025-04-16 NOTE — PROGRESS NOTES
Physical Therapy Treatment    Patient Name: Mary Ann Haro  MRN: [id number]  Today's Date: 4/16/2025  Time Calculation  Start Time: 0745  Stop Time: 0828  Time Calculation (min): 43 min  PT Therapeutic Procedures Time Entry  Manual Therapy Time Entry: 10  Therapeutic Exercise Time Entry: 25  Therapeutic Activity Time Entry: 8,      Current Problem  Problem List Items Addressed This Visit    None  Visit Diagnoses         Codes      Chronic pain of left knee    -  Primary M25.562, G89.29            Insurance:  Number of Treatments Authorized: 6/40            Subjective   General  Reason for Referral: left knee pain  Referred By: Kaushik Rdz Comment: Knee is feeling pretty good.  Mild tightness in the left calf still.  Has been doing a lot of heavy lifting at work without increased pain.  She would like to be able to get to/from a kneeling position with greater ease.  Patient is in the process of obtaining new footwear for work.    Performing HEP?: Yes    Precautions     Pain  Pain Assessment: 0-10  0-10 (Numeric) Pain Score: 0 - No pain  Pain Location: Knee    Objective       Treatments:    Therapeutic Exercise  Therapeutic Exercise Activity 1: SciFit Man L3 x 6 min  Therapeutic Exercise Activity 2: incline gastroc stretch x 1 min  Therapeutic Exercise Activity 3: incline soleus stretch x 1 min  Therapeutic Exercise Activity 4: dynamics - march, butt kick, tin soldier, R/L lateral lunge  Therapeutic Exercise Activity 5: T Gym L7 DL squats 3 x 10 (0,1,2 bands)  Therapeutic Exercise Activity 6: YTB loop alt hip ABD x 1 min  Therapeutic Exercise Activity 7: YTB loop alt hip EXT x 1 min  Therapeutic Exercise Activity 8: B HS curls 40# 2 x 10  Therapeutic Exercise Activity 9: NWB HF stretch R/L x 2 min ea         Manual Therapy  Manual Therapy Activity 1: MFR B Leg pull  Manual Therapy Activity 2: PROM B hips and knees  Manual Therapy Activity 3: stretch B gluts, HS, ADD, piriformis, ITB, HF, quad    Therapeutic  "Activity  Therapeutic Activity 1: from kneeling on airex pad - to standing transfer  Therapeutic Activity 2: 6\" FWD step up woth OKD x 10 ea R/L                OP EDUCATION:       Assessment:  PT Assessment  Assessment Comment: Patient is progressing well with reduced knee pain sx.  Mild calf tightness which was reduced with stretching and exercise.  Demonstrated kneeling to standing floor transfer well.    Plan:  OP PT Plan  Treatment/Interventions: Cryotherapy, Dry needling, Education/ Instruction, Manual therapy, Neuromuscular re-education, Prosthetic management/ training, Taping techniques, Therapeutic activities, Therapeutic exercises  PT Plan: Skilled PT  PT Frequency: 2 times per week  Duration: 5weeks  Number of Treatments Authorized: 6/40  Rehab Potential: Good  Plan of Care Agreement: Patient    Goals:       Solange Tolbert PTA  "

## 2025-04-21 ENCOUNTER — TREATMENT (OUTPATIENT)
Dept: PHYSICAL THERAPY | Facility: CLINIC | Age: 45
End: 2025-04-21
Payer: COMMERCIAL

## 2025-04-21 DIAGNOSIS — G89.29 CHRONIC PAIN OF LEFT KNEE: ICD-10-CM

## 2025-04-21 DIAGNOSIS — M25.562 CHRONIC PAIN OF LEFT KNEE: ICD-10-CM

## 2025-04-21 PROCEDURE — 97164 PT RE-EVAL EST PLAN CARE: CPT | Mod: GP

## 2025-04-21 PROCEDURE — 97110 THERAPEUTIC EXERCISES: CPT | Mod: GP

## 2025-04-21 ASSESSMENT — PAIN - FUNCTIONAL ASSESSMENT: PAIN_FUNCTIONAL_ASSESSMENT: 0-10

## 2025-04-21 ASSESSMENT — PAIN SCALES - GENERAL: PAINLEVEL_OUTOF10: 0 - NO PAIN

## 2025-04-21 NOTE — PROGRESS NOTES
Physical Therapy Treatment    Patient Name: Mary Ann Haro  MRN: [id number]  Today's Date: 4/21/2025  Time Calculation  Start Time: 1645  Stop Time: 1705  Time Calculation (min): 20 min  PT Therapeutic Procedures Time Entry  Therapeutic Exercise Time Entry: 10    Insurance:  Visit number: Number of Treatments Authorized: 6/40  Authorization info:  80/20 COVERAGE 40 VISITS OOP 10,000 REF MAIRA H 3/24/25  Insurance Type: Payor: [employer] [place] / Plan: [employer] / Product Type: *No Product type* /     Current Problem   1. Chronic pain of left knee  Follow Up In Physical Therapy          Subjective   General   Reason for Referral: left knee pain  Referred By: Kaushik  General Comment: Knee is feeling pretty good.  Mild tightness in the left calf still.  Has been doing a lot of heavy lifting at work without increased pain.  She would like to be able to get to/from a kneeling position with greater ease.  Patient is in the process of obtaining new footwear for work.  Precautions:     Pain   Pain Assessment: 0-10  0-10 (Numeric) Pain Score: 0 - No pain  Pain Location: Knee  Post Treatment Pain Level 0/10    Objective   L Knee flex= 125 (4+/5) Ext=0 (4+/5)  Mild PF crep with end range knee extesion  Treatments:  Therapeutic Exercise:  Therapeutic Exercise  Therapeutic Exercise Activity 1: SciFit Man L3 x 6 min  Therapeutic Exercise Activity 2: incline gastroc stretch x 1 min  Therapeutic Exercise Activity 3: incline soleus stretch x 1 min  Therapeutic Exercise Activity 4: dynamics - march, butt kick, tin soldier, R/L lateral lunge  Therapeutic Exercise Activity 5: T Gym L7 DL squats 3 x 10 (0,1,2 bands)  Therapeutic Exercise Activity 6: YTB loop alt hip ABD x 1 min  Therapeutic Exercise Activity 7: YTB loop alt hip EXT x 1 min  Therapeutic Exercise Activity 8: B HS curls 40# 2 x 10  Therapeutic Exercise Activity 9: NWB HF stretch R/L x 2 min ea       Assessment   Assessment:   PT  Assessment  Assessment Comment: Patient is progressing well with reduced knee pain sx.  Mild calf tightness which was reduced with stretching and exercise.  Demonstrated kneeling to standing floor transfer well.    Plan:   OP PT Plan  Treatment/Interventions: Cryotherapy, Dry needling, Education/ Instruction, Manual therapy, Neuromuscular re-education, Prosthetic management/ training, Taping techniques, Therapeutic activities, Therapeutic exercises  PT Plan: Skilled PT  PT Frequency: 2 times per week  Duration: 5weeks  Number of Treatments Authorized: 6/40  Rehab Potential: Good  Plan of Care Agreement: Patient    OP EDUCATION:       Goals:   SHORT TERM GOALS:  Patient will report decrease in pain from 8/10 to </= 1/10 to improve quality of life. A  Patient will improve left knee AROM to WFL in order to improve gait mechanics and functional mobility A  Patient will demonstrate sit to stand x10 without UE to demonstrate improved LE strength. A  Patient will improve 0 score to </= 10 seconds to reduce fall risk. A  Patient independent with prescribed HEP A  Pt Education - Independent postural and  awareness and joint protection program A  LONG TERM GOALS:  Patient will be independent with HEP to promote self management of condition A  Patient will perform reciprocal stair negotiation to demonstrate improved LE strength. A  Patient will ambulate with least restrictive device and proper gait mechanics to promote return to prior level of function. A  Functional Level - reported % (hobbies/work/recreation) A

## 2025-04-24 ENCOUNTER — TREATMENT (OUTPATIENT)
Dept: PHYSICAL THERAPY | Facility: CLINIC | Age: 45
End: 2025-04-24
Payer: COMMERCIAL

## 2025-04-24 DIAGNOSIS — M25.562 CHRONIC PAIN OF LEFT KNEE: Primary | ICD-10-CM

## 2025-04-24 DIAGNOSIS — G89.29 CHRONIC PAIN OF LEFT KNEE: Primary | ICD-10-CM

## 2025-04-24 PROCEDURE — 97110 THERAPEUTIC EXERCISES: CPT | Mod: GP,CQ

## 2025-04-24 PROCEDURE — 97112 NEUROMUSCULAR REEDUCATION: CPT | Mod: GP,CQ

## 2025-04-24 ASSESSMENT — PAIN - FUNCTIONAL ASSESSMENT: PAIN_FUNCTIONAL_ASSESSMENT: 0-10

## 2025-04-24 ASSESSMENT — PAIN SCALES - GENERAL: PAINLEVEL_OUTOF10: 1

## 2025-04-24 NOTE — PROGRESS NOTES
"Physical Therapy Treatment    Patient Name: Mary Ann Haro  MRN: [id number]  Today's Date: 4/24/2025    Current Problem  Problem List Items Addressed This Visit           ICD-10-CM    Chronic pain of left knee - Primary M25.562, G89.29       Insurance:  Payor: [employer] [facility] / Plan: [employer] / Product Type: *No Product type* /   Number of Treatments Authorized: 8/40          Subjective   General  Reason for Referral: left knee pain  Referred By: Kaushik  General Comment: PT STATES HER KNEE IS A LITTLE SORE TODAY.    Performing HEP?: Yes    Precautions     Pain  Pain Assessment: 0-10  0-10 (Numeric) Pain Score: 1  Pain Location: Knee  Pain Orientation: Left    Objective   General Observation  General Observation: ANTALGIC GAIT    Treatments:    Therapeutic Exercise  Therapeutic Exercise Activity 1: SciFit HILLS L2  x 6 min  Therapeutic Exercise Activity 2: incline gastroc stretch x 1 min  Therapeutic Exercise Activity 3: incline soleus stretch x 1 min  Therapeutic Exercise Activity 4: dynamics - march, butt kick, tin soldier  Therapeutic Exercise Activity 5: SCOOTER 2 X 40'  Therapeutic Exercise Activity 6: FOOTWORM YELLOW LOOP 2 X 40'  Therapeutic Exercise Activity 7: TG L7 YELLOW BAND X 1 MIN  Therapeutic Exercise Activity 8: STANDING HIP FLEX STRETCH L X 1 MIN // BAR  Therapeutic Exercise Activity 9: HEEL RAISES X 1 MIN  Therapeutic Exercise Activity 10: 12\" STEP SELF KNEE FLEX MOBS X 2 MIN  Therapeutic Exercise Activity 11: BRIDGE HOLD 10\" X 2 MIN  Therapeutic Exercise Activity 12: HL HIP ABD R/L ALT GREEN TBAND X 2 MIN    Balance/Neuromuscular Re-Education  Balance/Neuromuscular Re-Education Activity 1: TILT BOARD BALANCING F/B, S/S X 2 MIN EACH // BAR  Balance/Neuromuscular Re-Education Activity 2: 6\" FWD STEP UPS 2 X 1 MIN // BAR                             OP EDUCATION:  Outpatient Education  Education Comment: CONTINUE WITH CURRENT HEP    Assessment:  PT Assessment  Assessment " Comment: PT MINGO EX'S WELL. SHE WAS CHALLENGED AND FATIGUED WITH TODAY'S THER EX AND BALANCE ACTIVITIES. PT WAS TIGHT IN HER QUAD AND HIP FLEX TODAY.  PT FELT BETTER AFTER TODAY'S WORKOUT.    Plan:  OP PT Plan  Treatment/Interventions: Cryotherapy, Dry needling, Education/ Instruction, Manual therapy, Neuromuscular re-education, Prosthetic management/ training, Taping techniques, Therapeutic activities, Therapeutic exercises  PT Plan: Skilled PT  PT Frequency: 2 times per week  Duration: 5weeks  Number of Treatments Authorized: 8/40  Rehab Potential: Good  Plan of Care Agreement: Patient    Goals:       Time Calculation  Start Time: 1653  Stop Time: 1731  Time Calculation (min): 38 min  PT Therapeutic Procedures Time Entry  Neuromuscular Re-Education Time Entry: 8  Therapeutic Exercise Time Entry: 30,

## 2025-04-28 ENCOUNTER — TREATMENT (OUTPATIENT)
Dept: PHYSICAL THERAPY | Facility: CLINIC | Age: 45
End: 2025-04-28
Payer: COMMERCIAL

## 2025-04-28 DIAGNOSIS — M25.562 CHRONIC PAIN OF LEFT KNEE: ICD-10-CM

## 2025-04-28 DIAGNOSIS — G89.29 CHRONIC PAIN OF LEFT KNEE: ICD-10-CM

## 2025-04-28 PROCEDURE — 97110 THERAPEUTIC EXERCISES: CPT | Mod: GP,CQ

## 2025-04-28 ASSESSMENT — PAIN - FUNCTIONAL ASSESSMENT: PAIN_FUNCTIONAL_ASSESSMENT: 0-10

## 2025-04-28 ASSESSMENT — PAIN SCALES - GENERAL: PAINLEVEL_OUTOF10: 0 - NO PAIN

## 2025-04-28 NOTE — PROGRESS NOTES
Physical Therapy Treatment    Patient Name: Mary Ann Haro  MRN: [id number]  Today's Date: 4/28/2025  Time Calculation  Start Time: 1645  Stop Time: 1730  Time Calculation (min): 45 min  PT Therapeutic Procedures Time Entry  Manual Therapy Time Entry: 5  Therapeutic Exercise Time Entry: 40,      Current Problem  Problem List Items Addressed This Visit           ICD-10-CM    Chronic pain of left knee M25.562, G89.29       Insurance:  Number of Treatments Authorized: 9/40            Subjective   General  Reason for Referral: left knee pain  Referred By: Kaushik  General Comment: Patient notes she was wobbly doing dynamic exercises last week.  Her thighs were sore for a day or so after last session. Some hesitation ascending stairs due to having pain previously.    Performing HEP?: Yes    Precautions     Pain  Pain Assessment: 0-10  0-10 (Numeric) Pain Score: 0 - No pain  Pain Location: Knee  Pain Orientation: Left    Objective       Treatments:    Therapeutic Exercise  Therapeutic Exercise Activity 1: SciFit HILLS L2  x 6 min  Therapeutic Exercise Activity 2: incline gastroc stretch x 1 min  Therapeutic Exercise Activity 3: incline soleus stretch x 1 min  Therapeutic Exercise Activity 4: dynamics - march, butt kick, tin soldier, toe walk, heel walk  Therapeutic Exercise Activity 5: TGym L7 squats 1 yellow band 3 x 10  Therapeutic Exercise Activity 6: TGym L5 R/L SL squat 2 x 10 ea  Therapeutic Exercise Activity 7: FOOTWORM YELLOW LOOP 2 X 40'  Therapeutic Exercise Activity 8: ZigZag F/R x 40' ea  Therapeutic Exercise Activity 9: B leg extension 10# 2 x 10  Therapeutic Exercise Activity 10: NWB HF stretch R/L x 2 min ea with strap assist         Manual Therapy  Manual Therapy Activity 1: L PF mobs  Manual Therapy Activity 2: L quad sweeps                     OP EDUCATION:  Outpatient Education  Education Comment: Access Code: [email or web address]  URL: [email or web address]  Date: 04/28/2025  Prepared by:  Yadi Tolbert    Exercises  - Sit to Stand Without Arm Support  - 3 x daily - 7 x weekly - 10 reps    Assessment:  PT Assessment  Assessment Comment: PAtient is progressing well with exercises.  L quad fatigues with strengthening. Non-painful.    Plan:  OP PT Plan  Treatment/Interventions: Cryotherapy, Dry needling, Education/ Instruction, Manual therapy, Neuromuscular re-education, Prosthetic management/ training, Taping techniques, Therapeutic activities, Therapeutic exercises  PT Plan: Skilled PT  PT Frequency: 2 times per week  Duration: 5weeks  Number of Treatments Authorized: 9/40  Rehab Potential: Good  Plan of Care Agreement: Patient    Goals:       Solange Tolbert, PTA

## 2025-05-05 ENCOUNTER — APPOINTMENT (OUTPATIENT)
Dept: PHYSICAL THERAPY | Facility: CLINIC | Age: 45
End: 2025-05-05
Payer: COMMERCIAL

## 2025-05-28 ENCOUNTER — APPOINTMENT (OUTPATIENT)
Dept: OBSTETRICS AND GYNECOLOGY | Facility: CLINIC | Age: 45
End: 2025-05-28
Payer: COMMERCIAL

## 2025-05-28 VITALS
DIASTOLIC BLOOD PRESSURE: 70 MMHG | WEIGHT: 210 LBS | HEIGHT: 62 IN | SYSTOLIC BLOOD PRESSURE: 112 MMHG | BODY MASS INDEX: 38.64 KG/M2

## 2025-05-28 DIAGNOSIS — N93.9 ABNORMAL UTERINE BLEEDING (AUB): ICD-10-CM

## 2025-05-28 PROBLEM — Z91.49 HISTORY OF PSYCHOLOGICAL TRAUMA: Status: RESOLVED | Noted: 2025-05-28 | Resolved: 2025-05-28

## 2025-05-28 PROBLEM — K52.9 COLITIS: Status: RESOLVED | Noted: 2025-05-28 | Resolved: 2025-05-28

## 2025-05-28 LAB
ERYTHROCYTE [DISTWIDTH] IN BLOOD BY AUTOMATED COUNT: 12.4 % (ref 11–15)
HCT VFR BLD AUTO: 42.2 % (ref 35–45)
HGB BLD-MCNC: 14.1 G/DL (ref 11.7–15.5)
MCH RBC QN AUTO: 29.4 PG (ref 27–33)
MCHC RBC AUTO-ENTMCNC: 33.4 G/DL (ref 32–36)
MCV RBC AUTO: 88.1 FL (ref 80–100)
PLATELET # BLD AUTO: 336 THOUSAND/UL (ref 140–400)
PMV BLD REES-ECKER: 11.2 FL (ref 7.5–12.5)
PREGNANCY TEST URINE, POC: NEGATIVE
PROLACTIN SERPL-MCNC: 8.8 NG/ML
RBC # BLD AUTO: 4.79 MILLION/UL (ref 3.8–5.1)
TSH SERPL-ACNC: 1.07 MIU/L
WBC # BLD AUTO: 7 THOUSAND/UL (ref 3.8–10.8)

## 2025-05-28 PROCEDURE — 99213 OFFICE O/P EST LOW 20 MIN: CPT | Performed by: NURSE PRACTITIONER

## 2025-05-28 PROCEDURE — 1036F TOBACCO NON-USER: CPT | Performed by: NURSE PRACTITIONER

## 2025-05-28 PROCEDURE — 3008F BODY MASS INDEX DOCD: CPT | Performed by: NURSE PRACTITIONER

## 2025-05-28 PROCEDURE — 81025 URINE PREGNANCY TEST: CPT | Performed by: NURSE PRACTITIONER

## 2025-05-28 ASSESSMENT — ENCOUNTER SYMPTOMS
ENDOCRINE NEGATIVE: 1
CARDIOVASCULAR NEGATIVE: 1
RESPIRATORY NEGATIVE: 1
HEMATOLOGIC/LYMPHATIC NEGATIVE: 1
HEADACHES: 1
GASTROINTESTINAL NEGATIVE: 1
ALLERGIC/IMMUNOLOGIC NEGATIVE: 1
EYES NEGATIVE: 1
PSYCHIATRIC NEGATIVE: 1
MUSCULOSKELETAL NEGATIVE: 1
CONSTITUTIONAL NEGATIVE: 1

## 2025-05-28 NOTE — PROGRESS NOTES
"Chief Complaint    Vaginal Bleeding        HPI    PAP 2-23-21 NEG HPV NEG  MAMMO 10-15-24  DEXA NEVER  COLON 5-23-19    - 3 CYCLES WITHIN APRIL AND MAY - HEAVY BLEEDING, CRAMPS, PAINFUL  Last edited by Raj Lund MA on 5/28/2025  9:54 AM.         44 y.o. G0 female who presents for evaluation of abnormal bleeding.  She was last seen in 08/2023.  Menstrual cycles have been irregular. Skipping at times up to 2 months.   Reports recent AUB.   Has had x3 periods between April-May.  Very heavy cycle in April. Then had very light cycle, spotting. Then another heavy cycle again.  Bleeding lasting 4 days.   Changing protection every few hours at heaviest with occasional clots.  Some pelvic pain, cramping.   Does endorse worsening mood changes and hot flashes.  She is sexually active with . IO UCG negative.   PMH: Anxiety, depression, GERD, obesity  Family hx: Breast cancer - PGM. No other family h/o GYN related cancers or disorders.     /70   Ht 1.575 m (5' 2\")   Wt 95.3 kg (210 lb)   BMI 38.41 kg/m²      Current Outpatient Medications   Medication Instructions    ascorbic acid (Vitamin C) 250 mg tablet Take by mouth.    azelastine (Astelin) 137 mcg (0.1 %) nasal spray 1 spray, Each Nostril, 2 times daily, Use in each nostril as directed    azithromycin (Zithromax) 250 mg tablet Take 2 tabs day one then one tab days 2 through 5    calcium carb-magnesium carb 250-300 mg tablet 1 tablet, Daily    CALCIUM ORAL Take by mouth.    cholecalciferol (Vitamin D-3) 25 MCG (1000 UT) tablet 1 tablet, Daily    fluticasone propionate (Xhance) 93 mcg/actuation aerosol breath activated Spray 1 spray in each nostril twice daily.    multivitamin capsule 1 capsule, Daily    pantoprazole (PROTONIX) 40 mg, oral, Daily        Review of Systems   Constitutional: Negative.    HENT: Negative.     Eyes: Negative.    Respiratory: Negative.     Cardiovascular: Negative.    Gastrointestinal: Negative.    Endocrine: Negative.  "   Genitourinary:  Positive for menstrual problem.   Musculoskeletal: Negative.    Skin: Negative.    Allergic/Immunologic: Negative.    Neurological:  Positive for headaches.   Hematological: Negative.    Psychiatric/Behavioral: Negative.     All other systems reviewed and are negative.       Physical Exam  Constitutional:       Appearance: Normal appearance.   HENT:      Head: Normocephalic.      Nose: Nose normal.   Pulmonary:      Effort: Pulmonary effort is normal.   Genitourinary:     General: Normal vulva.      Vagina: Normal.      Cervix: Normal.      Uterus: Normal.       Adnexa: Right adnexa normal and left adnexa normal.      Comments: Bimanual limited d/t habitus  Musculoskeletal:         General: Normal range of motion.      Cervical back: Normal range of motion and neck supple.   Neurological:      Mental Status: She is alert.   Psychiatric:         Mood and Affect: Mood normal.         Behavior: Behavior normal.          Assessment/Plan:  1. Abnormal uterine bleeding (AUB)  -Discussed possible etiologies of abnormal bleeding in detail including anovulation or ovulatory dysfunction, endocrine disorders, hyperplasia, malignancy.   -Orders for blood work and pelvic ultrasound for further evaluation:  - POCT pregnancy, urine manually resulted: Negative  - CBC; Future  - Prolactin; Future  - TSH with reflex to Free T4 if abnormal; Future  - US PELVIS TRANSABDOMINAL WITH TRANSVAGINAL; Future  -Will notify of results.    -Discussed role of endometrial biopsy for evaluation of pathology of endometrium.   -Plan of care to be determined when results of labs and US are available to review.

## 2025-06-03 ENCOUNTER — HOSPITAL ENCOUNTER (OUTPATIENT)
Dept: RADIOLOGY | Facility: HOSPITAL | Age: 45
Discharge: HOME | End: 2025-06-03
Payer: COMMERCIAL

## 2025-06-03 DIAGNOSIS — N93.9 ABNORMAL UTERINE BLEEDING (AUB): ICD-10-CM

## 2025-06-03 PROCEDURE — 76856 US EXAM PELVIC COMPLETE: CPT | Performed by: RADIOLOGY

## 2025-06-03 PROCEDURE — 76856 US EXAM PELVIC COMPLETE: CPT

## 2025-06-03 PROCEDURE — 76830 TRANSVAGINAL US NON-OB: CPT | Performed by: RADIOLOGY

## 2025-06-17 ENCOUNTER — APPOINTMENT (OUTPATIENT)
Dept: PRIMARY CARE | Facility: CLINIC | Age: 45
End: 2025-06-17
Payer: COMMERCIAL

## 2025-06-17 VITALS
DIASTOLIC BLOOD PRESSURE: 80 MMHG | WEIGHT: 208 LBS | BODY MASS INDEX: 38.28 KG/M2 | SYSTOLIC BLOOD PRESSURE: 120 MMHG | OXYGEN SATURATION: 96 % | HEIGHT: 62 IN | HEART RATE: 80 BPM

## 2025-06-17 DIAGNOSIS — K21.9 CHRONIC GERD: ICD-10-CM

## 2025-06-17 DIAGNOSIS — Z13.6 SCREENING FOR CARDIOVASCULAR CONDITION: ICD-10-CM

## 2025-06-17 DIAGNOSIS — Z00.00 ANNUAL PHYSICAL EXAM: Primary | ICD-10-CM

## 2025-06-17 PROCEDURE — 1036F TOBACCO NON-USER: CPT

## 2025-06-17 PROCEDURE — 3008F BODY MASS INDEX DOCD: CPT

## 2025-06-17 PROCEDURE — 99213 OFFICE O/P EST LOW 20 MIN: CPT

## 2025-06-17 PROCEDURE — 99396 PREV VISIT EST AGE 40-64: CPT

## 2025-06-17 RX ORDER — SUCRALFATE 1 G/1
1 TABLET ORAL
Qty: 360 TABLET | Refills: 3 | Status: SHIPPED | OUTPATIENT
Start: 2025-06-17 | End: 2026-06-17

## 2025-06-17 ASSESSMENT — PATIENT HEALTH QUESTIONNAIRE - PHQ9
1. LITTLE INTEREST OR PLEASURE IN DOING THINGS: NOT AT ALL
2. FEELING DOWN, DEPRESSED OR HOPELESS: NOT AT ALL
SUM OF ALL RESPONSES TO PHQ9 QUESTIONS 1 AND 2: 0

## 2025-06-17 NOTE — ASSESSMENT & PLAN NOTE
Complete history and physical examination was performed  Mammogram and Pap managed with Gyn  Colonoscopy due 2029  We will notify of test results once available and make treatment recommendations accordingly

## 2025-06-17 NOTE — PROGRESS NOTES
I reviewed and examined the patient. I was present for the key exam elements, and I fully participated in the patient's care. I discussed the management of the care with the resident. I have personally reviewed the pertinent labs and imaging, as well as recent notes, with the patient. I have reviewed the note above and agree with the resident's medical decision making as documented in the resident's note, in addition to the following comments / findings:     Agree with the rest of the plan outlined below by resident physician. No red flags.      The patient understands and agrees to the assessment and plan of care. Patient has also agreed to follow up and comply with the treatment and evaluation as recommended today. Patient was instructed to call the office at [phone] should questions arise regarding their treatment or care.     Brent Faulkner DO, FAOASM  Family Medicine   [address], [address]  [place] [address]     Brent Faulkner DO

## 2025-06-17 NOTE — PROGRESS NOTES
Chief Complaint  Patient ID: Mary Ann Haro is a 44 y.o. female who presents for Annual Exam.         Reviewed all medications by prescribing practitioner or clinical pharmacist (such as prescriptions, OTCs, herbal therapies and supplements) and documented in the medical record.    History of Present Illness  1.  Physical exam.  - Pap: last done 2021  - Mammogram: last done 10/2024, due in Oct  - Colonoscopy: last done 2019 w 10 year clearance  - Immunizations: up to date  - Diet: Poor with reflux symptoms  - Exercise: Active at work  - Occupation: [employer], works at a restaurant, started a cleaning business  - Smoking: Never smoker  - Alcohol: Occasional, socially    2. GERD, poorly controlled  - Takes pantoprazole 40 mg daily, tolerates well but having significant breakthrough symptoms  - Complains of chest burning and upset stomach  - Triggered by all foods now, and sometimes will prevent her from eating at all    Review of Systems  All pertinent positive symptoms are included in the history of present illness.    All other systems have been reviewed and are negative and noncontributory to this patient's current ailments.    Past Medical History  She has a past medical history of Colitis (05/28/2025), History of psychological trauma (05/28/2025), Other conditions influencing health status (01/15/2020), Other microscopic colitis, Personal history of other diseases of the digestive system (01/15/2020), and Personal history of other mental and behavioral disorders.    Surgical History  She has a past surgical history that includes Other surgical history (06/19/2019).     Social History  She reports that she has never smoked. She has never used smokeless tobacco. She reports current alcohol use. She reports that she does not use drugs.    Family History[1]  Medications Prior to Visit[2]    Allergies  Penicillins; Venlafaxine; Zoloft [sertraline]; Fluoxetine; and Latex, natural rubber    Immunization  "History   Administered Date(s) Administered    COVID-19, mRNA, LNP-S, PF, 30 mcg/0.3 mL dose 10/23/2021    Flu vaccine (IIV4), preservative free *Check age/dose* 09/23/2021, 09/25/2022    Flu vaccine, quadrivalent, no egg protein, age 6 month or greater (FLUCELVAX) 12/03/2023    Influenza, Unspecified 12/06/2023    Pfizer COVID-19 vaccine, 12 years and older, (30mcg/0.3mL) (Comirnaty) 12/03/2023, 12/10/2024    Pfizer COVID-19 vaccine, bivalent, age 12 years and older (30 mcg/0.3 mL) 09/25/2022    Pfizer Purple Cap SARS-CoV-2 03/28/2021, 04/18/2021     Objective   Visit Vitals  /80   Pulse 80   Ht 1.575 m (5' 2\")   Wt 94.3 kg (208 lb)   SpO2 96%   BMI 38.04 kg/m²   OB Status Perimenopausal   Smoking Status Never   BSA 2.03 m²        BP Readings from Last 3 Encounters:   06/17/25 120/80   05/28/25 112/70   02/23/25 137/71      Wt Readings from Last 3 Encounters:   06/17/25 94.3 kg (208 lb)   05/28/25 95.3 kg (210 lb)   02/23/25 95.3 kg (210 lb)      Relevant Results  Office Visit on 05/28/2025   Component Date Value    Preg Test, Ur 05/28/2025 Negative     WHITE BLOOD CELL COUNT 05/28/2025 7.0     RED BLOOD CELL COUNT 05/28/2025 4.79     HEMOGLOBIN 05/28/2025 14.1     HEMATOCRIT 05/28/2025 42.2     MCV 05/28/2025 88.1     MCH 05/28/2025 29.4     MCHC 05/28/2025 33.4     RDW 05/28/2025 12.4     PLATELET COUNT 05/28/2025 336     MPV 05/28/2025 11.2     PROLACTIN 05/28/2025 8.8     TSH W/REFLEX TO FT4 05/28/2025 1.07      The ASCVD Risk score (Jonathan NGUYEN, et al., 2019) failed to calculate for the following reasons:    Cannot find a previous HDL lab    Cannot find a previous total cholesterol lab    Physical Exam  CONSTITUTIONAL - well nourished, well developed, looks like stated age, in no acute distress, not ill-appearing, and not tired appearing  SKIN - normal skin color and pigmentation, normal skin turgor without rash, lesions, or nodules visualized  HEAD - no trauma, normocephalic  EYES - pupils are equal and " reactive to light, extraocular muscles are intact, and normal external exam  ENT - TM's intact, no injection, no signs of infection, uvula midline, normal tongue movement and throat normal, no exudate  NECK - supple without rigidity, no neck mass was observed  CHEST - clear to auscultation, no wheezing, no crackles and no rales, good effort  CARDIAC - regular rate and regular rhythm, no skipped beats, no murmur  ABDOMEN - no organomegaly, soft, nontender, nondistended, normal bowel sounds  EXTREMITIES - no obvious or evident edema, no obvious or evident deformities  NEUROLOGICAL - normal gait, normal balance, normal motor, no ataxia; alert, oriented and no focal signs  PSYCHIATRIC - alert, pleasant and cordial, age-appropriate  IMMUNOLOGIC - no cervical lymphadenopathy    Assessment and Plan  Follow up annually or sooner if needed.     Problem List Items Addressed This Visit       Annual physical exam - Primary    Complete history and physical examination was performed  Mammogram and Pap managed with Gyn  Colonoscopy due 2029  We will notify of test results once available and make treatment recommendations accordingly         Relevant Orders    Comprehensive Metabolic Panel    Lipid Panel    Chronic GERD    Due to symptoms being poorly controlled, I will add carafate 1 g to be taken before meals and at bedtime. Also referred to GI for further evaluation and consideration of EGD.          Relevant Medications    sucralfate (Carafate) 1 gram tablet    Other Relevant Orders    Referral to Gastroenterology     Other Visit Diagnoses         Screening for cardiovascular condition        Relevant Orders    Lipid Panel               [1]   Family History  Problem Relation Name Age of Onset    Anxiety disorder Mother      Depression Mother      Hypertension Mother      Osteoporosis Mother      Hyperlipidemia Mother      Anxiety disorder Father      Depression Father      Other (cardiac disorder) Father      Heart attack  Father          Myocardial infarction    Hypertension Father      Hyperlipidemia Father      Prostate cancer Maternal Grandfather      Stroke Other Grandmother         due to embolism of cerebral artery    Blood clot Other Grandmother     Other (cardiac disorder) Other Grandmother     Heart attack Other Grandmother     Breast cancer Other Grandmother     Heart attack Other Grandfather     Breast cancer Paternal Grandmother Carissa tolentino    [2]   Outpatient Medications Prior to Visit   Medication Sig Dispense Refill    ascorbic acid (Vitamin C) 250 mg tablet Take by mouth.      azelastine (Astelin) 137 mcg (0.1 %) nasal spray Administer 1 spray into each nostril 2 times a day. Use in each nostril as directed 30 mL 12    calcium carb-magnesium carb 250-300 mg tablet Take 1 tablet by mouth once daily.      CALCIUM ORAL Take by mouth.      cholecalciferol (Vitamin D-3) 25 MCG (1000 UT) tablet Take 1 tablet (25 mcg) by mouth once daily.      fluticasone propionate (Xhance) 93 mcg/actuation aerosol breath activated Spray 1 spray in each nostril twice daily. 16 mL 11    multivitamin capsule Take 1 capsule by mouth once daily.      pantoprazole (ProtoNix) 40 mg EC tablet Take 1 tablet (40 mg) by mouth once daily. 90 tablet 1    azithromycin (Zithromax) 250 mg tablet Take 2 tabs day one then one tab days 2 through 5 (Patient not taking: Reported on 6/17/2025) 6 tablet 0     No facility-administered medications prior to visit.

## 2025-06-17 NOTE — ASSESSMENT & PLAN NOTE
Due to symptoms being poorly controlled, I will add carafate 1 g to be taken before meals and at bedtime. Also referred to GI for further evaluation and consideration of EGD.

## 2025-06-19 DIAGNOSIS — K21.9 CHRONIC GERD: ICD-10-CM

## 2025-06-19 RX ORDER — PANTOPRAZOLE SODIUM 40 MG/1
40 TABLET, DELAYED RELEASE ORAL DAILY
Qty: 90 TABLET | Refills: 2 | Status: SHIPPED | OUTPATIENT
Start: 2025-06-19

## 2025-06-24 LAB
ALBUMIN SERPL-MCNC: 4.5 G/DL (ref 3.6–5.1)
ALP SERPL-CCNC: 42 U/L (ref 31–125)
ALT SERPL-CCNC: 11 U/L (ref 6–29)
ANION GAP SERPL CALCULATED.4IONS-SCNC: 12 MMOL/L (CALC) (ref 7–17)
AST SERPL-CCNC: 17 U/L (ref 10–35)
BILIRUB SERPL-MCNC: 0.5 MG/DL (ref 0.2–1.2)
BUN SERPL-MCNC: 9 MG/DL (ref 7–25)
CALCIUM SERPL-MCNC: 9.1 MG/DL (ref 8.6–10.2)
CHLORIDE SERPL-SCNC: 103 MMOL/L (ref 98–110)
CHOLEST SERPL-MCNC: 214 MG/DL
CHOLEST/HDLC SERPL: 3.1 (CALC)
CO2 SERPL-SCNC: 24 MMOL/L (ref 20–32)
CREAT SERPL-MCNC: 0.54 MG/DL (ref 0.5–0.99)
EGFRCR SERPLBLD CKD-EPI 2021: 115 ML/MIN/1.73M2
GLUCOSE SERPL-MCNC: 90 MG/DL (ref 65–99)
HDLC SERPL-MCNC: 70 MG/DL
LDLC SERPL CALC-MCNC: 126 MG/DL (CALC)
NONHDLC SERPL-MCNC: 144 MG/DL (CALC)
POTASSIUM SERPL-SCNC: 3.8 MMOL/L (ref 3.5–5.3)
PROT SERPL-MCNC: 6.6 G/DL (ref 6.1–8.1)
SODIUM SERPL-SCNC: 139 MMOL/L (ref 135–146)
TRIGL SERPL-MCNC: 85 MG/DL

## 2025-07-01 ENCOUNTER — APPOINTMENT (OUTPATIENT)
Dept: OBSTETRICS AND GYNECOLOGY | Facility: CLINIC | Age: 45
End: 2025-07-01
Payer: COMMERCIAL

## 2025-07-01 VITALS — SYSTOLIC BLOOD PRESSURE: 126 MMHG | WEIGHT: 206 LBS | BODY MASS INDEX: 37.68 KG/M2 | DIASTOLIC BLOOD PRESSURE: 72 MMHG

## 2025-07-01 DIAGNOSIS — N93.9 ABNORMAL UTERINE BLEEDING (AUB): Primary | ICD-10-CM

## 2025-07-01 DIAGNOSIS — N88.2 CERVICAL STENOSIS (UTERINE CERVIX): ICD-10-CM

## 2025-07-01 DIAGNOSIS — Z53.8 FAILED ATTEMPTED SURGICAL PROCEDURE: ICD-10-CM

## 2025-07-01 LAB — PREGNANCY TEST URINE, POC: NEGATIVE

## 2025-07-01 PROCEDURE — 81025 URINE PREGNANCY TEST: CPT | Performed by: OBSTETRICS & GYNECOLOGY

## 2025-07-01 PROCEDURE — 99214 OFFICE O/P EST MOD 30 MIN: CPT | Performed by: OBSTETRICS & GYNECOLOGY

## 2025-07-01 NOTE — PROGRESS NOTES
Here for EMB.  Menstrual cycles have been irregular. Skipping at times up to 2 months.   Reports recent AUB.   Has had 4 periods between April-June with spotting in between.  Very heavy cycle in April. Then had very light cycle, spotting. Then another heavy cycle again.  Bleeding lasting 4 days.   Changing protection every few hours at heaviest with occasional clots.  Some pelvic pain, cramping.     6.5 x 4.1 x 4.4. There is a 1.9 cm uterine fibroid at the  fundus to the right of midline predominantly subserosal in position  with a small portion of the fibroid minimally contiguous with the  endometrium. Endometrial Thickness: 0.8 cm. No endometrial masses or  fluid collections are identified.    LMP 6/19, mild spotting since.    Patient ID: Mary Ann Haro is a 44 y.o. female.    Endometrial biopsy    Date/Time: 7/1/2025 12:11 PM    Performed by: Rupa Gurrola MD  Authorized by: Rupa Gurrola MD    Consent:     Consent obtained: written    Consent given by: patient    Risks discussed: bleeding, infection and pain    Patient agrees, verbalizes understanding, and wants to proceed: yes      Procedure explained and questions answered to patient or proxy's satisfaction: yes    Indications:     Indications: abnormal uterine bleeding    Pre-procedure:     Urine pregnancy test: negative    Procedure:     Prepped with: Betadine    Tenaculum used: yes      A local block was performed: yes      Block method: intracervical      Local anesthetic: lidocaine 2% w/o epi    Amount used (mL): 3    Cervix dilated: yes    Findings:     Cervix: stenotic      Uterus depth by sound (cm) comment: Unable to sound due to stenosis    Specimen collected comment: No  Comments:     Procedure comments: Cervix found to be stenotic. Unable to pass pipelle. Attempt to dilate cervix. Able to pass most narrow dilator 3 cm, but not further.     Decision made to discontinue procedure due to stenosis and concern for risk of  perforation.    Discussed situation with the patient and her .  Given stenosis of the cervix, gave 2 options for proceeding: either retry office EMB after use of Cytotec for cervical ripening or proceed to D&C procedure.  She would prefer the D&C procedure, so we will make arrangements for surgery.  Explained how the procedure is done. Will still have her use Cytotec prior to help with the cervical ripening.

## 2025-07-16 ENCOUNTER — PREP FOR PROCEDURE (OUTPATIENT)
Dept: OBSTETRICS AND GYNECOLOGY | Facility: CLINIC | Age: 45
End: 2025-07-16
Payer: COMMERCIAL

## 2025-07-16 DIAGNOSIS — N93.9 ABNORMAL UTERINE BLEEDING: Primary | ICD-10-CM

## 2025-07-16 RX ORDER — ACETAMINOPHEN 325 MG/1
975 TABLET ORAL ONCE
OUTPATIENT
Start: 2025-07-16 | End: 2025-07-16

## 2025-07-23 ENCOUNTER — ANESTHESIA EVENT (OUTPATIENT)
Dept: OPERATING ROOM | Facility: HOSPITAL | Age: 45
End: 2025-07-23
Payer: COMMERCIAL

## 2025-08-12 RX ORDER — OXYCODONE HYDROCHLORIDE 5 MG/1
5 TABLET ORAL EVERY 4 HOURS PRN
Refills: 0 | Status: CANCELLED | OUTPATIENT
Start: 2025-08-12

## 2025-08-12 RX ORDER — MEPERIDINE HYDROCHLORIDE 25 MG/ML
12.5 INJECTION INTRAMUSCULAR; INTRAVENOUS; SUBCUTANEOUS EVERY 10 MIN PRN
Status: CANCELLED | OUTPATIENT
Start: 2025-08-12

## 2025-08-12 RX ORDER — SODIUM CHLORIDE, SODIUM LACTATE, POTASSIUM CHLORIDE, CALCIUM CHLORIDE 600; 310; 30; 20 MG/100ML; MG/100ML; MG/100ML; MG/100ML
100 INJECTION, SOLUTION INTRAVENOUS CONTINUOUS
Status: CANCELLED | OUTPATIENT
Start: 2025-08-12 | End: 2025-08-13

## 2025-08-12 RX ORDER — ONDANSETRON HYDROCHLORIDE 2 MG/ML
4 INJECTION, SOLUTION INTRAVENOUS ONCE AS NEEDED
Status: CANCELLED | OUTPATIENT
Start: 2025-08-12

## 2025-08-12 RX ORDER — DIPHENHYDRAMINE HYDROCHLORIDE 50 MG/ML
12.5 INJECTION, SOLUTION INTRAMUSCULAR; INTRAVENOUS ONCE AS NEEDED
Status: CANCELLED | OUTPATIENT
Start: 2025-08-12

## 2025-08-12 RX ORDER — ALBUTEROL SULFATE 0.83 MG/ML
2.5 SOLUTION RESPIRATORY (INHALATION) ONCE AS NEEDED
Status: CANCELLED | OUTPATIENT
Start: 2025-08-12

## 2025-08-12 RX ORDER — DROPERIDOL 2.5 MG/ML
0.62 INJECTION, SOLUTION INTRAMUSCULAR; INTRAVENOUS ONCE AS NEEDED
Status: CANCELLED | OUTPATIENT
Start: 2025-08-12

## 2025-08-13 ENCOUNTER — HOSPITAL ENCOUNTER (OUTPATIENT)
Facility: HOSPITAL | Age: 45
Setting detail: OUTPATIENT SURGERY
Discharge: HOME | End: 2025-08-13
Attending: OBSTETRICS & GYNECOLOGY | Admitting: OBSTETRICS & GYNECOLOGY
Payer: COMMERCIAL

## 2025-08-13 ENCOUNTER — ANESTHESIA (OUTPATIENT)
Dept: OPERATING ROOM | Facility: HOSPITAL | Age: 45
End: 2025-08-13
Payer: COMMERCIAL

## 2025-08-13 VITALS
BODY MASS INDEX: 38.85 KG/M2 | TEMPERATURE: 97.9 F | DIASTOLIC BLOOD PRESSURE: 69 MMHG | SYSTOLIC BLOOD PRESSURE: 123 MMHG | HEIGHT: 62 IN | WEIGHT: 211.09 LBS | RESPIRATION RATE: 15 BRPM | OXYGEN SATURATION: 99 % | HEART RATE: 59 BPM

## 2025-08-13 DIAGNOSIS — N93.9 ABNORMAL UTERINE BLEEDING: Primary | ICD-10-CM

## 2025-08-13 PROBLEM — R11.2 PONV (POSTOPERATIVE NAUSEA AND VOMITING): Status: RESOLVED | Noted: 2025-08-13 | Resolved: 2025-08-13

## 2025-08-13 PROBLEM — Z98.890 PONV (POSTOPERATIVE NAUSEA AND VOMITING): Status: RESOLVED | Noted: 2025-08-13 | Resolved: 2025-08-13

## 2025-08-13 PROBLEM — Z98.890 PONV (POSTOPERATIVE NAUSEA AND VOMITING): Status: ACTIVE | Noted: 2025-08-13

## 2025-08-13 PROBLEM — R11.2 PONV (POSTOPERATIVE NAUSEA AND VOMITING): Status: ACTIVE | Noted: 2025-08-13

## 2025-08-13 LAB — PREGNANCY TEST URINE, POC: NEGATIVE

## 2025-08-13 PROCEDURE — 7100000009 HC PHASE TWO TIME - INITIAL BASE CHARGE: Performed by: OBSTETRICS & GYNECOLOGY

## 2025-08-13 PROCEDURE — 2500000004 HC RX 250 GENERAL PHARMACY W/ HCPCS (ALT 636 FOR OP/ED): Performed by: ANESTHESIOLOGY

## 2025-08-13 PROCEDURE — 7100000001 HC RECOVERY ROOM TIME - INITIAL BASE CHARGE: Performed by: OBSTETRICS & GYNECOLOGY

## 2025-08-13 PROCEDURE — 58558 HYSTEROSCOPY BIOPSY: CPT | Performed by: OBSTETRICS & GYNECOLOGY

## 2025-08-13 PROCEDURE — 2500000002 HC RX 250 W HCPCS SELF ADMINISTERED DRUGS (ALT 637 FOR MEDICARE OP, ALT 636 FOR OP/ED)

## 2025-08-13 PROCEDURE — 3600000007 HC OR TIME - EACH INCREMENTAL 1 MINUTE - PROCEDURE LEVEL TWO: Performed by: OBSTETRICS & GYNECOLOGY

## 2025-08-13 PROCEDURE — 2500000004 HC RX 250 GENERAL PHARMACY W/ HCPCS (ALT 636 FOR OP/ED)

## 2025-08-13 PROCEDURE — 2500000001 HC RX 250 WO HCPCS SELF ADMINISTERED DRUGS (ALT 637 FOR MEDICARE OP): Performed by: OBSTETRICS & GYNECOLOGY

## 2025-08-13 PROCEDURE — 3700000001 HC GENERAL ANESTHESIA TIME - INITIAL BASE CHARGE: Performed by: OBSTETRICS & GYNECOLOGY

## 2025-08-13 PROCEDURE — 7100000002 HC RECOVERY ROOM TIME - EACH INCREMENTAL 1 MINUTE: Performed by: OBSTETRICS & GYNECOLOGY

## 2025-08-13 PROCEDURE — 81025 URINE PREGNANCY TEST: CPT | Performed by: OBSTETRICS & GYNECOLOGY

## 2025-08-13 PROCEDURE — 7100000010 HC PHASE TWO TIME - EACH INCREMENTAL 1 MINUTE: Performed by: OBSTETRICS & GYNECOLOGY

## 2025-08-13 PROCEDURE — 3700000002 HC GENERAL ANESTHESIA TIME - EACH INCREMENTAL 1 MINUTE: Performed by: OBSTETRICS & GYNECOLOGY

## 2025-08-13 PROCEDURE — 88305 TISSUE EXAM BY PATHOLOGIST: CPT | Mod: TC,GEALAB | Performed by: OBSTETRICS & GYNECOLOGY

## 2025-08-13 PROCEDURE — 3600000002 HC OR TIME - INITIAL BASE CHARGE - PROCEDURE LEVEL TWO: Performed by: OBSTETRICS & GYNECOLOGY

## 2025-08-13 PROCEDURE — 2500000005 HC RX 250 GENERAL PHARMACY W/O HCPCS: Performed by: OBSTETRICS & GYNECOLOGY

## 2025-08-13 RX ORDER — KETOROLAC TROMETHAMINE 30 MG/ML
INJECTION, SOLUTION INTRAMUSCULAR; INTRAVENOUS AS NEEDED
Status: DISCONTINUED | OUTPATIENT
Start: 2025-08-13 | End: 2025-08-13

## 2025-08-13 RX ORDER — LIDOCAINE HYDROCHLORIDE 20 MG/ML
INJECTION, SOLUTION INFILTRATION; PERINEURAL AS NEEDED
Status: DISCONTINUED | OUTPATIENT
Start: 2025-08-13 | End: 2025-08-13

## 2025-08-13 RX ORDER — FENTANYL CITRATE 50 UG/ML
INJECTION, SOLUTION INTRAMUSCULAR; INTRAVENOUS AS NEEDED
Status: DISCONTINUED | OUTPATIENT
Start: 2025-08-13 | End: 2025-08-13

## 2025-08-13 RX ORDER — SODIUM CHLORIDE, SODIUM LACTATE, POTASSIUM CHLORIDE, CALCIUM CHLORIDE 600; 310; 30; 20 MG/100ML; MG/100ML; MG/100ML; MG/100ML
20 INJECTION, SOLUTION INTRAVENOUS CONTINUOUS
Status: DISCONTINUED | OUTPATIENT
Start: 2025-08-13 | End: 2025-08-13 | Stop reason: HOSPADM

## 2025-08-13 RX ORDER — ONDANSETRON HYDROCHLORIDE 2 MG/ML
INJECTION, SOLUTION INTRAVENOUS AS NEEDED
Status: DISCONTINUED | OUTPATIENT
Start: 2025-08-13 | End: 2025-08-13

## 2025-08-13 RX ORDER — MIDAZOLAM HYDROCHLORIDE 1 MG/ML
INJECTION, SOLUTION INTRAMUSCULAR; INTRAVENOUS AS NEEDED
Status: DISCONTINUED | OUTPATIENT
Start: 2025-08-13 | End: 2025-08-13

## 2025-08-13 RX ORDER — FAMOTIDINE 10 MG/ML
INJECTION INTRAVENOUS AS NEEDED
Status: DISCONTINUED | OUTPATIENT
Start: 2025-08-13 | End: 2025-08-13

## 2025-08-13 RX ORDER — PROPOFOL 10 MG/ML
INJECTION, EMULSION INTRAVENOUS AS NEEDED
Status: DISCONTINUED | OUTPATIENT
Start: 2025-08-13 | End: 2025-08-13

## 2025-08-13 RX ORDER — SODIUM CHLORIDE 0.9 G/100ML
INJECTION, SOLUTION IRRIGATION AS NEEDED
Status: DISCONTINUED | OUTPATIENT
Start: 2025-08-13 | End: 2025-08-13 | Stop reason: HOSPADM

## 2025-08-13 RX ORDER — APREPITANT 40 MG/1
CAPSULE ORAL AS NEEDED
Status: DISCONTINUED | OUTPATIENT
Start: 2025-08-13 | End: 2025-08-13

## 2025-08-13 RX ORDER — SUCCINYLCHOLINE CHLORIDE 20 MG/ML
INJECTION INTRAMUSCULAR; INTRAVENOUS AS NEEDED
Status: DISCONTINUED | OUTPATIENT
Start: 2025-08-13 | End: 2025-08-13

## 2025-08-13 RX ORDER — ACETAMINOPHEN 325 MG/1
975 TABLET ORAL ONCE
Status: COMPLETED | OUTPATIENT
Start: 2025-08-13 | End: 2025-08-13

## 2025-08-13 RX ADMIN — FENTANYL CITRATE 50 MCG: 50 INJECTION, SOLUTION INTRAMUSCULAR; INTRAVENOUS at 07:33

## 2025-08-13 RX ADMIN — ACETAMINOPHEN 975 MG: 325 TABLET ORAL at 06:42

## 2025-08-13 RX ADMIN — FENTANYL CITRATE 50 MCG: 50 INJECTION, SOLUTION INTRAMUSCULAR; INTRAVENOUS at 07:49

## 2025-08-13 RX ADMIN — MIDAZOLAM 2 MG: 1 INJECTION INTRAMUSCULAR; INTRAVENOUS at 07:30

## 2025-08-13 RX ADMIN — FAMOTIDINE 20 MG: 10 INJECTION, SOLUTION INTRAVENOUS at 07:10

## 2025-08-13 RX ADMIN — DEXAMETHASONE SODIUM PHOSPHATE 10 MG: 4 INJECTION INTRA-ARTICULAR; INTRALESIONAL; INTRAMUSCULAR; INTRAVENOUS; SOFT TISSUE at 07:40

## 2025-08-13 RX ADMIN — KETOROLAC TROMETHAMINE 15 MG: 30 INJECTION, SOLUTION INTRAMUSCULAR; INTRAVENOUS at 07:56

## 2025-08-13 RX ADMIN — ONDANSETRON 4 MG: 2 INJECTION, SOLUTION INTRAMUSCULAR; INTRAVENOUS at 07:56

## 2025-08-13 RX ADMIN — LIDOCAINE HYDROCHLORIDE 100 MG: 20 INJECTION, SOLUTION INFILTRATION; PERINEURAL at 07:34

## 2025-08-13 RX ADMIN — SODIUM CHLORIDE, POTASSIUM CHLORIDE, SODIUM LACTATE AND CALCIUM CHLORIDE: 600; 310; 30; 20 INJECTION, SOLUTION INTRAVENOUS at 07:25

## 2025-08-13 RX ADMIN — PROPOFOL 200 MG: 10 INJECTION, EMULSION INTRAVENOUS at 07:34

## 2025-08-13 RX ADMIN — PROPOFOL 50 MCG/KG/MIN: 10 INJECTION, EMULSION INTRAVENOUS at 07:37

## 2025-08-13 RX ADMIN — SUCCINYLCHOLINE CHLORIDE 120 MG: 20 INJECTION, SOLUTION INTRAMUSCULAR; INTRAVENOUS at 07:34

## 2025-08-13 RX ADMIN — APREPITANT 40 MG: 40 CAPSULE ORAL at 07:10

## 2025-08-13 RX ADMIN — SODIUM CHLORIDE, POTASSIUM CHLORIDE, SODIUM LACTATE AND CALCIUM CHLORIDE 20 ML/HR: 600; 310; 30; 20 INJECTION, SOLUTION INTRAVENOUS at 06:42

## 2025-08-13 SDOH — HEALTH STABILITY: MENTAL HEALTH: CURRENT SMOKER: 0

## 2025-08-13 ASSESSMENT — PAIN SCALES - GENERAL
PAINLEVEL_OUTOF10: 0 - NO PAIN

## 2025-08-13 ASSESSMENT — PAIN - FUNCTIONAL ASSESSMENT
PAIN_FUNCTIONAL_ASSESSMENT: 0-10
PAIN_FUNCTIONAL_ASSESSMENT: 0-10

## 2025-08-14 ENCOUNTER — TELEPHONE (OUTPATIENT)
Dept: OBSTETRICS AND GYNECOLOGY | Facility: CLINIC | Age: 45
End: 2025-08-14
Payer: COMMERCIAL

## 2025-08-18 LAB
LABORATORY COMMENT REPORT: NORMAL
PATH REPORT.COMMENTS IMP SPEC: NORMAL
PATH REPORT.FINAL DX SPEC: NORMAL
PATH REPORT.GROSS SPEC: NORMAL
PATH REPORT.RELEVANT HX SPEC: NORMAL
PATH REPORT.TOTAL CANCER: NORMAL

## 2025-08-26 ENCOUNTER — APPOINTMENT (OUTPATIENT)
Dept: OBSTETRICS AND GYNECOLOGY | Facility: CLINIC | Age: 45
End: 2025-08-26
Payer: COMMERCIAL

## 2025-08-26 VITALS — WEIGHT: 208 LBS | SYSTOLIC BLOOD PRESSURE: 124 MMHG | DIASTOLIC BLOOD PRESSURE: 70 MMHG | BODY MASS INDEX: 38.04 KG/M2

## 2025-08-26 DIAGNOSIS — N84.0 ENDOMETRIUM, POLYP: ICD-10-CM

## 2025-08-26 DIAGNOSIS — N93.9 ABNORMAL UTERINE BLEEDING (AUB): Primary | ICD-10-CM

## 2025-08-26 PROCEDURE — 1036F TOBACCO NON-USER: CPT | Performed by: OBSTETRICS & GYNECOLOGY

## 2025-08-26 PROCEDURE — 99214 OFFICE O/P EST MOD 30 MIN: CPT | Performed by: OBSTETRICS & GYNECOLOGY

## 2025-08-26 RX ORDER — MEDROXYPROGESTERONE ACETATE 10 MG/1
TABLET ORAL
Qty: 36 TABLET | Refills: 3 | Status: SHIPPED | OUTPATIENT
Start: 2025-08-26

## 2025-08-26 ASSESSMENT — ENCOUNTER SYMPTOMS
ABDOMINAL DISTENTION: 1
ABDOMINAL PAIN: 1

## 2025-09-02 ENCOUNTER — APPOINTMENT (OUTPATIENT)
Dept: GASTROENTEROLOGY | Facility: CLINIC | Age: 45
End: 2025-09-02
Payer: COMMERCIAL

## 2025-09-02 ASSESSMENT — ENCOUNTER SYMPTOMS
CHILLS: 0
DIZZINESS: 0
JOINT SWELLING: 0
LIGHT-HEADEDNESS: 0
ARTHRALGIAS: 0
SORE THROAT: 0
NUMBNESS: 0
PHOTOPHOBIA: 0
HEADACHES: 0
AGITATION: 0
FLANK PAIN: 0
FREQUENCY: 0
BACK PAIN: 0
NERVOUS/ANXIOUS: 0
HALLUCINATIONS: 0
HEMATURIA: 0
MYALGIAS: 0
FATIGUE: 0
EYE PAIN: 0
WEAKNESS: 0
SHORTNESS OF BREATH: 0
WHEEZING: 0
PALPITATIONS: 0
DYSURIA: 0
ADENOPATHY: 0
COUGH: 0
DIAPHORESIS: 0
FEVER: 0

## 2025-09-04 ENCOUNTER — OFFICE VISIT (OUTPATIENT)
Dept: URGENT CARE | Age: 45
End: 2025-09-04
Payer: COMMERCIAL

## 2025-09-04 VITALS
OXYGEN SATURATION: 98 % | SYSTOLIC BLOOD PRESSURE: 119 MMHG | HEART RATE: 65 BPM | BODY MASS INDEX: 38.23 KG/M2 | TEMPERATURE: 97.8 F | DIASTOLIC BLOOD PRESSURE: 79 MMHG | WEIGHT: 209 LBS | RESPIRATION RATE: 16 BRPM

## 2025-09-04 DIAGNOSIS — N61.0 CELLULITIS OF LEFT BREAST: Primary | ICD-10-CM

## 2025-09-04 RX ORDER — DOXYCYCLINE 100 MG/1
100 TABLET ORAL 2 TIMES DAILY
Qty: 14 TABLET | Refills: 0 | Status: SHIPPED | OUTPATIENT
Start: 2025-09-04 | End: 2025-09-11

## (undated) DEVICE — Device

## (undated) DEVICE — COVER HANDLE LIGHT, STERIS, BLUE, STERILE

## (undated) DEVICE — SUTURE, VICRYL, 4-0, 18 IN, PS2, UNDYED

## (undated) DEVICE — SOLUTION, IV 0.9% NACL INJECTION USP 1000ML EXCEL PLUS, BAG